# Patient Record
Sex: FEMALE | Race: WHITE | Employment: OTHER | URBAN - METROPOLITAN AREA
[De-identification: names, ages, dates, MRNs, and addresses within clinical notes are randomized per-mention and may not be internally consistent; named-entity substitution may affect disease eponyms.]

---

## 2017-10-02 PROBLEM — R73.01 IFG (IMPAIRED FASTING GLUCOSE): Status: ACTIVE | Noted: 2017-10-02

## 2017-11-28 ENCOUNTER — HOSPITAL ENCOUNTER (OUTPATIENT)
Dept: LAB | Age: 72
Discharge: HOME | End: 2017-11-28

## 2017-11-28 PROCEDURE — 88305 TISSUE EXAM BY PATHOLOGIST: CPT | Performed by: INTERNAL MEDICINE

## 2018-01-02 PROBLEM — M25.551 HIP PAIN, RIGHT: Status: ACTIVE | Noted: 2018-01-02

## 2018-04-06 PROBLEM — E66.01 SEVERE OBESITY (BMI 35.0-39.9) WITH COMORBIDITY (HCC): Status: ACTIVE | Noted: 2018-04-06

## 2019-04-22 PROBLEM — E66.01 SEVERE OBESITY (HCC): Status: RESOLVED | Noted: 2019-04-22 | Resolved: 2019-04-22

## 2019-04-22 PROBLEM — E66.01 SEVERE OBESITY (HCC): Status: ACTIVE | Noted: 2019-04-22

## 2019-04-22 PROBLEM — E66.01 SEVERE OBESITY (BMI 35.0-39.9) WITH COMORBIDITY (HCC): Status: RESOLVED | Noted: 2018-04-06 | Resolved: 2019-04-22

## 2019-04-29 ENCOUNTER — HOSPITAL ENCOUNTER (OUTPATIENT)
Dept: GENERAL RADIOLOGY | Age: 74
Discharge: HOME | End: 2019-04-29
Attending: PHYSICIAN ASSISTANT
Payer: MEDICARE

## 2019-04-29 DIAGNOSIS — R05.9 COUGH: ICD-10-CM

## 2019-04-29 DIAGNOSIS — R06.09 DYSPNEA ON EXERTION: ICD-10-CM

## 2019-04-29 PROCEDURE — 71046 X-RAY EXAM CHEST 2 VIEWS: CPT

## 2019-04-30 ENCOUNTER — HOSPITAL ENCOUNTER (OUTPATIENT)
Dept: CT IMAGING | Age: 74
Discharge: HOME | End: 2019-04-30
Attending: PHYSICIAN ASSISTANT

## 2019-04-30 DIAGNOSIS — R06.09 DYSPNEA ON EXERTION: ICD-10-CM

## 2019-04-30 DIAGNOSIS — R00.0 TACHYCARDIA: ICD-10-CM

## 2019-04-30 RX ORDER — SODIUM CHLORIDE 0.9 % (FLUSH) 0.9 %
10 SYRINGE (ML) INJECTION
Status: COMPLETED | OUTPATIENT
Start: 2019-04-30 | End: 2019-04-30

## 2019-04-30 RX ADMIN — Medication 10 ML: at 15:25

## 2019-06-07 ENCOUNTER — HOSPITAL ENCOUNTER (OUTPATIENT)
Dept: GENERAL RADIOLOGY | Age: 74
Discharge: HOME | End: 2019-06-07
Attending: PHYSICIAN ASSISTANT
Payer: MEDICARE

## 2019-06-07 DIAGNOSIS — J18.9 PNEUMONIA OF RIGHT LUNG DUE TO INFECTIOUS ORGANISM, UNSPECIFIED PART OF LUNG: ICD-10-CM

## 2019-06-07 PROCEDURE — 71046 X-RAY EXAM CHEST 2 VIEWS: CPT

## 2019-06-10 NOTE — PROGRESS NOTES
X-ray shows pneumonia has resolved. Results will be discussed with patient during upcoming office visit.

## 2019-07-08 ENCOUNTER — HOSPITAL ENCOUNTER (OUTPATIENT)
Dept: MAMMOGRAPHY | Age: 74
Discharge: HOME | End: 2019-07-08
Attending: PHYSICIAN ASSISTANT
Payer: MEDICARE

## 2019-07-08 DIAGNOSIS — M81.0 OSTEOPOROSIS WITHOUT CURRENT PATHOLOGICAL FRACTURE, UNSPECIFIED OSTEOPOROSIS TYPE: ICD-10-CM

## 2019-07-08 PROCEDURE — 77080 DXA BONE DENSITY AXIAL: CPT

## 2019-07-08 NOTE — PROGRESS NOTES
Bone density testing shows osteopenia. We can discuss recommendations during upcoming appt if she will remind me.

## 2019-07-18 ENCOUNTER — HOSPITAL ENCOUNTER (OUTPATIENT)
Dept: GENERAL RADIOLOGY | Age: 74
Discharge: HOME | End: 2019-07-18
Payer: MEDICARE

## 2019-07-18 DIAGNOSIS — R93.89 ABNORMAL FINDING ON CHEST XRAY: ICD-10-CM

## 2019-07-18 PROCEDURE — 71046 X-RAY EXAM CHEST 2 VIEWS: CPT

## 2019-07-18 NOTE — PROGRESS NOTES
Chest x-ray looks clear this time! Results will be discussed with patient during upcoming office visit.

## 2020-05-04 ENCOUNTER — HOSPITAL ENCOUNTER (OUTPATIENT)
Dept: LAB | Age: 75
Discharge: HOME | End: 2020-05-04
Attending: PHYSICIAN ASSISTANT
Payer: MEDICARE

## 2020-05-04 DIAGNOSIS — E03.9 ACQUIRED HYPOTHYROIDISM: ICD-10-CM

## 2020-05-04 DIAGNOSIS — N18.30 CKD (CHRONIC KIDNEY DISEASE) STAGE 3, GFR 30-59 ML/MIN (HCC): ICD-10-CM

## 2020-05-04 DIAGNOSIS — R73.01 ELEVATED FASTING GLUCOSE: ICD-10-CM

## 2020-05-04 DIAGNOSIS — E78.5 DYSLIPIDEMIA: ICD-10-CM

## 2020-05-04 DIAGNOSIS — E55.9 VITAMIN D DEFICIENCY: ICD-10-CM

## 2020-05-04 DIAGNOSIS — R82.90 BAD ODOR OF URINE: ICD-10-CM

## 2020-05-04 LAB
ALBUMIN SERPL-MCNC: 3.2 G/DL (ref 3.2–4.6)
ALBUMIN/GLOB SERPL: 0.9 {RATIO} (ref 1.2–3.5)
ALP SERPL-CCNC: 71 U/L (ref 50–136)
ALT SERPL-CCNC: 27 U/L (ref 12–65)
ANION GAP SERPL CALC-SCNC: 7 MMOL/L (ref 7–16)
APPEARANCE UR: ABNORMAL
AST SERPL-CCNC: 21 U/L (ref 15–37)
BACTERIA URNS QL MICRO: ABNORMAL /HPF
BASOPHILS # BLD: 0 K/UL (ref 0–0.2)
BASOPHILS NFR BLD: 0 % (ref 0–2)
BILIRUB SERPL-MCNC: 0.4 MG/DL (ref 0.2–1.1)
BILIRUB UR QL: NEGATIVE
BUN SERPL-MCNC: 13 MG/DL (ref 8–23)
CALCIUM SERPL-MCNC: 8.5 MG/DL (ref 8.3–10.4)
CASTS URNS QL MICRO: ABNORMAL /LPF
CHLORIDE SERPL-SCNC: 107 MMOL/L (ref 98–107)
CHOLEST SERPL-MCNC: 176 MG/DL
CO2 SERPL-SCNC: 27 MMOL/L (ref 21–32)
COLOR UR: YELLOW
CREAT SERPL-MCNC: 0.97 MG/DL (ref 0.6–1)
DIFFERENTIAL METHOD BLD: NORMAL
EOSINOPHIL # BLD: 0.2 K/UL (ref 0–0.8)
EOSINOPHIL NFR BLD: 4 % (ref 0.5–7.8)
EPI CELLS #/AREA URNS HPF: ABNORMAL /HPF
ERYTHROCYTE [DISTWIDTH] IN BLOOD BY AUTOMATED COUNT: 13.4 % (ref 11.9–14.6)
EST. AVERAGE GLUCOSE BLD GHB EST-MCNC: 126 MG/DL
GLOBULIN SER CALC-MCNC: 3.5 G/DL (ref 2.3–3.5)
GLUCOSE SERPL-MCNC: 97 MG/DL (ref 65–100)
GLUCOSE UR STRIP.AUTO-MCNC: NEGATIVE MG/DL
HBA1C MFR BLD: 6 %
HCT VFR BLD AUTO: 43 % (ref 35.8–46.3)
HDLC SERPL-MCNC: 54 MG/DL (ref 40–60)
HDLC SERPL: 3.3 {RATIO}
HGB BLD-MCNC: 13.8 G/DL (ref 11.7–15.4)
HGB UR QL STRIP: NEGATIVE
IMM GRANULOCYTES # BLD AUTO: 0 K/UL (ref 0–0.5)
IMM GRANULOCYTES NFR BLD AUTO: 0 % (ref 0–5)
KETONES UR QL STRIP.AUTO: NEGATIVE MG/DL
LDLC SERPL CALC-MCNC: 92.8 MG/DL
LEUKOCYTE ESTERASE UR QL STRIP.AUTO: ABNORMAL
LIPID PROFILE,FLP: ABNORMAL
LYMPHOCYTES # BLD: 1 K/UL (ref 0.5–4.6)
LYMPHOCYTES NFR BLD: 20 % (ref 13–44)
MCH RBC QN AUTO: 29.3 PG (ref 26.1–32.9)
MCHC RBC AUTO-ENTMCNC: 32.1 G/DL (ref 31.4–35)
MCV RBC AUTO: 91.3 FL (ref 79.6–97.8)
MONOCYTES # BLD: 0.4 K/UL (ref 0.1–1.3)
MONOCYTES NFR BLD: 8 % (ref 4–12)
NEUTS SEG # BLD: 3.4 K/UL (ref 1.7–8.2)
NEUTS SEG NFR BLD: 67 % (ref 43–78)
NITRITE UR QL STRIP.AUTO: NEGATIVE
NRBC # BLD: 0 K/UL (ref 0–0.2)
PH UR STRIP: 5 [PH] (ref 5–9)
PLATELET # BLD AUTO: 225 K/UL (ref 150–450)
PMV BLD AUTO: 10.5 FL (ref 9.4–12.3)
POTASSIUM SERPL-SCNC: 3.9 MMOL/L (ref 3.5–5.1)
PROT SERPL-MCNC: 6.7 G/DL (ref 6.3–8.2)
PROT UR STRIP-MCNC: NEGATIVE MG/DL
RBC # BLD AUTO: 4.71 M/UL (ref 4.05–5.2)
RBC #/AREA URNS HPF: 0 /HPF
SODIUM SERPL-SCNC: 141 MMOL/L (ref 136–145)
SP GR UR REFRACTOMETRY: 1.01 (ref 1–1.02)
TRIGL SERPL-MCNC: 146 MG/DL (ref 35–150)
TSH SERPL DL<=0.005 MIU/L-ACNC: 4.35 UIU/ML
UROBILINOGEN UR QL STRIP.AUTO: 0.2 EU/DL (ref 0.2–1)
VLDLC SERPL CALC-MCNC: 29.2 MG/DL (ref 6–23)
WBC # BLD AUTO: 5 K/UL (ref 4.3–11.1)
WBC URNS QL MICRO: ABNORMAL /HPF

## 2020-05-04 PROCEDURE — 82306 VITAMIN D 25 HYDROXY: CPT

## 2020-05-04 PROCEDURE — 84443 ASSAY THYROID STIM HORMONE: CPT

## 2020-05-04 PROCEDURE — 80053 COMPREHEN METABOLIC PANEL: CPT

## 2020-05-04 PROCEDURE — 36415 COLL VENOUS BLD VENIPUNCTURE: CPT

## 2020-05-04 PROCEDURE — 80061 LIPID PANEL: CPT

## 2020-05-04 PROCEDURE — 81001 URINALYSIS AUTO W/SCOPE: CPT

## 2020-05-04 PROCEDURE — 85025 COMPLETE CBC W/AUTO DIFF WBC: CPT

## 2020-05-04 PROCEDURE — 83036 HEMOGLOBIN GLYCOSYLATED A1C: CPT

## 2020-05-04 NOTE — PROGRESS NOTES
Urine showed leukocytes which may indicate an infection. Blood sugar shows nice improvements! Kidney & liver function are normal.  Cholesterol levels are well maintained with current regimen. Thyroid function is up a bit but within reasonable range. Glucose average is up a little but remains in prediabetic range. Blood counts are normal.  Results will be discussed with patient during upcoming office visit.

## 2020-05-05 LAB — 25(OH)D3+25(OH)D2 SERPL-MCNC: 43.6 NG/ML (ref 30–100)

## 2020-05-05 NOTE — PROGRESS NOTES
Vitamin d levels are well maintained on current supplementation. Results will be discussed with patient during upcoming office visit.

## 2020-11-17 ENCOUNTER — HOSPITAL ENCOUNTER (OUTPATIENT)
Dept: GENERAL RADIOLOGY | Age: 75
Discharge: HOME | End: 2020-11-17
Payer: MEDICARE

## 2020-11-17 DIAGNOSIS — M54.50 ACUTE BILATERAL LOW BACK PAIN WITHOUT SCIATICA: ICD-10-CM

## 2020-11-17 PROCEDURE — 72100 X-RAY EXAM L-S SPINE 2/3 VWS: CPT

## 2020-11-19 NOTE — PROGRESS NOTES
Left message with patient to discuss results. X-ray shows degenerative changes in upper and lower lumbar regions with mild scoliosis. She could benefit from chiropractic care with Dr. Rudy Tyler @ [facility] [place]  ([phone]) or physical therapy.

## 2020-11-20 NOTE — PROGRESS NOTES
Pt notified that x-ray shows degenerative changes in upper and lower lumbar regions with mild scoliosis. She could benefit from chiropractic care with Dr. Genoveva Haines @ [facility] [address] ([phone]) or physical therapy. Pt will contact Dr. Charla [facility] office to schedule an appt with them.

## 2021-07-12 ENCOUNTER — HOSPITAL ENCOUNTER (OUTPATIENT)
Dept: MAMMOGRAPHY | Age: 76
Discharge: HOME | End: 2021-07-12
Attending: PHYSICIAN ASSISTANT
Payer: MEDICARE

## 2021-07-12 DIAGNOSIS — M89.9 BONE DISORDER: ICD-10-CM

## 2021-07-12 DIAGNOSIS — M85.852 OSTEOPENIA OF BOTH HIPS: ICD-10-CM

## 2021-07-12 DIAGNOSIS — M85.851 OSTEOPENIA OF BOTH HIPS: ICD-10-CM

## 2021-07-12 PROCEDURE — 77080 DXA BONE DENSITY AXIAL: CPT

## 2021-07-13 NOTE — PROGRESS NOTES
Bone density study shows persistent osteopenia but has worsened by almost 4% in the past 2 years. I don't see that she currently takes anything specifically for this condition. If this is true, we need to discuss treatment options at next available open appointment.

## 2021-07-13 NOTE — PROGRESS NOTES
Pt notified that her bone density study shows persistent osteopenia but has worsened by almost 4% in the past 2 years. Pt verbalized understanding and is scheduled for a follow-up on 8/5/21.

## 2021-08-25 ENCOUNTER — HOSPITAL ENCOUNTER (OUTPATIENT)
Dept: INFUSION THERAPY | Age: 76
Discharge: HOME | End: 2021-08-25
Payer: MEDICARE

## 2021-08-25 VITALS
OXYGEN SATURATION: 95 % | DIASTOLIC BLOOD PRESSURE: 82 MMHG | TEMPERATURE: 98.3 F | SYSTOLIC BLOOD PRESSURE: 137 MMHG | RESPIRATION RATE: 18 BRPM | HEART RATE: 79 BPM

## 2021-08-25 DIAGNOSIS — M81.0 AGE-RELATED OSTEOPOROSIS WITHOUT CURRENT PATHOLOGICAL FRACTURE: Primary | ICD-10-CM

## 2021-08-25 PROCEDURE — 74011250636 HC RX REV CODE- 250/636: Performed by: PHYSICIAN ASSISTANT

## 2021-08-25 PROCEDURE — 96372 THER/PROPH/DIAG INJ SC/IM: CPT

## 2021-08-25 RX ADMIN — DENOSUMAB 60 MG: 60 INJECTION SUBCUTANEOUS at 10:30

## 2021-08-25 NOTE — PROGRESS NOTES
Arrived to the [facility]. Prolia injection completed. Provided education on same    Patient instructed to report any side affects to ordering provider. Patient tolerated well. Any issues or concerns during appointment: none. Discharged ambulatory.

## 2022-02-25 ENCOUNTER — HOSPITAL ENCOUNTER (OUTPATIENT)
Dept: INFUSION THERAPY | Age: 77
Discharge: HOME | End: 2022-02-25
Payer: MEDICARE

## 2022-02-25 VITALS
SYSTOLIC BLOOD PRESSURE: 120 MMHG | OXYGEN SATURATION: 94 % | RESPIRATION RATE: 18 BRPM | TEMPERATURE: 97.9 F | HEART RATE: 78 BPM | DIASTOLIC BLOOD PRESSURE: 70 MMHG

## 2022-02-25 DIAGNOSIS — M81.0 AGE-RELATED OSTEOPOROSIS WITHOUT CURRENT PATHOLOGICAL FRACTURE: Primary | ICD-10-CM

## 2022-02-25 LAB
MAGNESIUM SERPL-MCNC: 2.2 MG/DL (ref 1.8–2.4)
PHOSPHATE SERPL-MCNC: 2.9 MG/DL (ref 2.3–3.7)

## 2022-02-25 PROCEDURE — 83735 ASSAY OF MAGNESIUM: CPT

## 2022-02-25 PROCEDURE — 96372 THER/PROPH/DIAG INJ SC/IM: CPT

## 2022-02-25 PROCEDURE — 74011250636 HC RX REV CODE- 250/636: Performed by: PHYSICIAN ASSISTANT

## 2022-02-25 PROCEDURE — 84100 ASSAY OF PHOSPHORUS: CPT

## 2022-02-25 RX ADMIN — DENOSUMAB 60 MG: 60 INJECTION SUBCUTANEOUS at 10:51

## 2022-02-25 NOTE — PROGRESS NOTES
Arrived to the [facility]. Peripheral labs (mag and phos, per therapy plan) drawn and Prolia injection completed. Provided education on Prolia. Patient has had this medication before. Opportunity for questions provided. Patient instructed to report any side affects to ordering provider. Patient tolerated well. Any issues or concerns during appointment: no.  Patient aware of next lab/infusion appointment on 8/25/2022 (date) at 8 am (time). Discharged ambulatory with self.

## 2022-03-19 PROBLEM — M25.551 HIP PAIN, RIGHT: Status: ACTIVE | Noted: 2018-01-02

## 2022-03-20 PROBLEM — E66.01 SEVERE OBESITY (HCC): Status: ACTIVE | Noted: 2019-04-22

## 2022-03-20 PROBLEM — R73.01 IFG (IMPAIRED FASTING GLUCOSE): Status: ACTIVE | Noted: 2017-10-02

## 2022-06-01 RX ORDER — ESCITALOPRAM OXALATE 20 MG/1
TABLET ORAL
Qty: 90 TABLET | OUTPATIENT
Start: 2022-06-01

## 2022-06-01 RX ORDER — ROSUVASTATIN CALCIUM 10 MG/1
TABLET, COATED ORAL
Qty: 90 TABLET | OUTPATIENT
Start: 2022-06-01

## 2022-06-02 RX ORDER — CLONAZEPAM 1 MG/1
TABLET ORAL
Qty: 30 TABLET | OUTPATIENT
Start: 2022-06-02

## 2022-06-06 RX ORDER — ROSUVASTATIN CALCIUM 10 MG/1
TABLET, COATED ORAL
Qty: 90 TABLET | OUTPATIENT
Start: 2022-06-06

## 2022-06-13 DIAGNOSIS — R73.01 IFG (IMPAIRED FASTING GLUCOSE): ICD-10-CM

## 2022-06-13 DIAGNOSIS — E55.9 VITAMIN D DEFICIENCY: ICD-10-CM

## 2022-06-13 DIAGNOSIS — Z00.00 MEDICARE ANNUAL WELLNESS VISIT, SUBSEQUENT: Primary | ICD-10-CM

## 2022-06-13 DIAGNOSIS — E03.9 HYPOTHYROIDISM, UNSPECIFIED TYPE: ICD-10-CM

## 2022-06-13 DIAGNOSIS — E78.5 DYSLIPIDEMIA: ICD-10-CM

## 2022-06-14 ENCOUNTER — NURSE ONLY (OUTPATIENT)
Dept: INTERNAL MEDICINE CLINIC | Facility: CLINIC | Age: 77
End: 2022-06-14

## 2022-06-14 DIAGNOSIS — E03.9 HYPOTHYROIDISM, UNSPECIFIED TYPE: ICD-10-CM

## 2022-06-14 DIAGNOSIS — Z00.00 MEDICARE ANNUAL WELLNESS VISIT, SUBSEQUENT: ICD-10-CM

## 2022-06-14 DIAGNOSIS — R73.01 IFG (IMPAIRED FASTING GLUCOSE): ICD-10-CM

## 2022-06-14 DIAGNOSIS — E78.5 DYSLIPIDEMIA: ICD-10-CM

## 2022-06-14 DIAGNOSIS — E55.9 VITAMIN D DEFICIENCY: ICD-10-CM

## 2022-06-15 LAB
25(OH)D3 SERPL-MCNC: 69.7 NG/ML (ref 30–100)
ALBUMIN SERPL-MCNC: 3.5 G/DL (ref 3.2–4.6)
ALBUMIN/GLOB SERPL: 1.3 {RATIO} (ref 1.2–3.5)
ALP SERPL-CCNC: 54 U/L (ref 50–136)
ALT SERPL-CCNC: 31 U/L (ref 12–65)
ANION GAP SERPL CALC-SCNC: 5 MMOL/L (ref 7–16)
AST SERPL-CCNC: 19 U/L (ref 15–37)
BASOPHILS # BLD: 0 K/UL (ref 0–0.2)
BASOPHILS NFR BLD: 1 % (ref 0–2)
BILIRUB SERPL-MCNC: 0.4 MG/DL (ref 0.2–1.1)
BUN SERPL-MCNC: 18 MG/DL (ref 8–23)
CALCIUM SERPL-MCNC: 8.8 MG/DL (ref 8.3–10.4)
CHLORIDE SERPL-SCNC: 109 MMOL/L (ref 98–107)
CHOLEST SERPL-MCNC: 163 MG/DL
CO2 SERPL-SCNC: 28 MMOL/L (ref 21–32)
CREAT SERPL-MCNC: 1.1 MG/DL (ref 0.6–1)
DIFFERENTIAL METHOD BLD: ABNORMAL
EOSINOPHIL # BLD: 0.4 K/UL (ref 0–0.8)
EOSINOPHIL NFR BLD: 7 % (ref 0.5–7.8)
ERYTHROCYTE [DISTWIDTH] IN BLOOD BY AUTOMATED COUNT: 13.2 % (ref 11.9–14.6)
EST. AVERAGE GLUCOSE BLD GHB EST-MCNC: 123 MG/DL
GLOBULIN SER CALC-MCNC: 2.7 G/DL (ref 2.3–3.5)
GLUCOSE SERPL-MCNC: 94 MG/DL (ref 65–100)
HBA1C MFR BLD: 5.9 % (ref 4.2–6.3)
HCT VFR BLD AUTO: 45.3 % (ref 35.8–46.3)
HDLC SERPL-MCNC: 51 MG/DL (ref 40–60)
HDLC SERPL: 3.2 {RATIO}
HGB BLD-MCNC: 14.2 G/DL (ref 11.7–15.4)
IMM GRANULOCYTES # BLD AUTO: 0 K/UL (ref 0–0.5)
IMM GRANULOCYTES NFR BLD AUTO: 0 % (ref 0–5)
LDLC SERPL CALC-MCNC: 81.8 MG/DL
LYMPHOCYTES # BLD: 1.3 K/UL (ref 0.5–4.6)
LYMPHOCYTES NFR BLD: 26 % (ref 13–44)
MCH RBC QN AUTO: 30.5 PG (ref 26.1–32.9)
MCHC RBC AUTO-ENTMCNC: 31.3 G/DL (ref 31.4–35)
MCV RBC AUTO: 97.2 FL (ref 79.6–97.8)
MONOCYTES # BLD: 0.4 K/UL (ref 0.1–1.3)
MONOCYTES NFR BLD: 8 % (ref 4–12)
NEUTS SEG # BLD: 3 K/UL (ref 1.7–8.2)
NEUTS SEG NFR BLD: 58 % (ref 43–78)
NRBC # BLD: 0 K/UL (ref 0–0.2)
PLATELET # BLD AUTO: 206 K/UL (ref 150–450)
PMV BLD AUTO: 10.8 FL (ref 9.4–12.3)
POTASSIUM SERPL-SCNC: 5.4 MMOL/L (ref 3.5–5.1)
PROT SERPL-MCNC: 6.2 G/DL (ref 6.3–8.2)
RBC # BLD AUTO: 4.66 M/UL (ref 4.05–5.2)
SODIUM SERPL-SCNC: 142 MMOL/L (ref 136–145)
TRIGL SERPL-MCNC: 151 MG/DL (ref 35–150)
TSH, 3RD GENERATION: 1.93 UIU/ML (ref 0.36–3.74)
VLDLC SERPL CALC-MCNC: 30.2 MG/DL (ref 6–23)
WBC # BLD AUTO: 5.2 K/UL (ref 4.3–11.1)

## 2022-06-27 ENCOUNTER — OFFICE VISIT (OUTPATIENT)
Dept: INTERNAL MEDICINE CLINIC | Facility: CLINIC | Age: 77
End: 2022-06-27
Payer: MEDICARE

## 2022-06-27 VITALS
WEIGHT: 234 LBS | TEMPERATURE: 97.9 F | HEART RATE: 90 BPM | DIASTOLIC BLOOD PRESSURE: 70 MMHG | OXYGEN SATURATION: 97 % | HEIGHT: 64 IN | BODY MASS INDEX: 39.95 KG/M2 | SYSTOLIC BLOOD PRESSURE: 125 MMHG

## 2022-06-27 DIAGNOSIS — Z00.00 MEDICARE ANNUAL WELLNESS VISIT, SUBSEQUENT: Primary | ICD-10-CM

## 2022-06-27 DIAGNOSIS — E03.9 HYPOTHYROIDISM, UNSPECIFIED TYPE: ICD-10-CM

## 2022-06-27 DIAGNOSIS — M15.9 PRIMARY OSTEOARTHRITIS INVOLVING MULTIPLE JOINTS: ICD-10-CM

## 2022-06-27 DIAGNOSIS — F51.04 CHRONIC INSOMNIA: ICD-10-CM

## 2022-06-27 DIAGNOSIS — R92.1 BREAST CALCIFICATIONS ON MAMMOGRAM: ICD-10-CM

## 2022-06-27 DIAGNOSIS — F41.1 GENERALIZED ANXIETY DISORDER: ICD-10-CM

## 2022-06-27 DIAGNOSIS — R73.01 IFG (IMPAIRED FASTING GLUCOSE): ICD-10-CM

## 2022-06-27 DIAGNOSIS — Z12.31 SCREENING MAMMOGRAM FOR BREAST CANCER: ICD-10-CM

## 2022-06-27 DIAGNOSIS — E87.8 ELECTROLYTE ABNORMALITY: ICD-10-CM

## 2022-06-27 DIAGNOSIS — E78.5 DYSLIPIDEMIA: ICD-10-CM

## 2022-06-27 DIAGNOSIS — N28.9 RENAL INSUFFICIENCY: ICD-10-CM

## 2022-06-27 DIAGNOSIS — F43.21 COMPLICATED GRIEF: ICD-10-CM

## 2022-06-27 DIAGNOSIS — E55.9 VITAMIN D DEFICIENCY: ICD-10-CM

## 2022-06-27 PROBLEM — N18.30 CHRONIC RENAL DISEASE, STAGE III (HCC): Status: ACTIVE | Noted: 2022-06-27

## 2022-06-27 PROCEDURE — 4004F PT TOBACCO SCREEN RCVD TLK: CPT | Performed by: PHYSICIAN ASSISTANT

## 2022-06-27 PROCEDURE — G8427 DOCREV CUR MEDS BY ELIG CLIN: HCPCS | Performed by: PHYSICIAN ASSISTANT

## 2022-06-27 PROCEDURE — G8417 CALC BMI ABV UP PARAM F/U: HCPCS | Performed by: PHYSICIAN ASSISTANT

## 2022-06-27 PROCEDURE — 1090F PRES/ABSN URINE INCON ASSESS: CPT | Performed by: PHYSICIAN ASSISTANT

## 2022-06-27 PROCEDURE — G0439 PPPS, SUBSEQ VISIT: HCPCS | Performed by: PHYSICIAN ASSISTANT

## 2022-06-27 PROCEDURE — G8399 PT W/DXA RESULTS DOCUMENT: HCPCS | Performed by: PHYSICIAN ASSISTANT

## 2022-06-27 PROCEDURE — 1123F ACP DISCUSS/DSCN MKR DOCD: CPT | Performed by: PHYSICIAN ASSISTANT

## 2022-06-27 PROCEDURE — 3017F COLORECTAL CA SCREEN DOC REV: CPT | Performed by: PHYSICIAN ASSISTANT

## 2022-06-27 PROCEDURE — 99214 OFFICE O/P EST MOD 30 MIN: CPT | Performed by: PHYSICIAN ASSISTANT

## 2022-06-27 RX ORDER — DICLOFENAC 35 MG/1
CAPSULE ORAL
Qty: 90 CAPSULE | OUTPATIENT
Start: 2022-06-27

## 2022-06-27 RX ORDER — DICLOFENAC 35 MG/1
1 CAPSULE ORAL 3 TIMES DAILY PRN
Qty: 90 CAPSULE | Refills: 11 | Status: SHIPPED | OUTPATIENT
Start: 2022-06-27

## 2022-06-27 RX ORDER — ACETAMINOPHEN 500 MG
500 TABLET ORAL EVERY 6 HOURS PRN
COMMUNITY
End: 2022-10-25 | Stop reason: ALTCHOICE

## 2022-06-27 RX ORDER — ERGOCALCIFEROL 1.25 MG/1
50000 CAPSULE ORAL WEEKLY
COMMUNITY
End: 2022-06-27 | Stop reason: ALTCHOICE

## 2022-06-27 RX ORDER — CLONAZEPAM 1 MG/1
1 TABLET ORAL NIGHTLY
Qty: 30 TABLET | Refills: 5 | Status: SHIPPED | OUTPATIENT
Start: 2022-07-02 | End: 2022-08-01

## 2022-06-27 RX ORDER — LORAZEPAM 1 MG/1
1 TABLET ORAL 2 TIMES DAILY PRN
Qty: 30 TABLET | Refills: 0 | Status: SHIPPED | OUTPATIENT
Start: 2022-06-27 | End: 2022-10-26 | Stop reason: SDUPTHER

## 2022-06-27 ASSESSMENT — LIFESTYLE VARIABLES
HOW MANY STANDARD DRINKS CONTAINING ALCOHOL DO YOU HAVE ON A TYPICAL DAY: 1 OR 2
HOW OFTEN DO YOU HAVE A DRINK CONTAINING ALCOHOL: 2-4 TIMES A MONTH

## 2022-06-27 ASSESSMENT — PATIENT HEALTH QUESTIONNAIRE - PHQ9
2. FEELING DOWN, DEPRESSED OR HOPELESS: 1
SUM OF ALL RESPONSES TO PHQ9 QUESTIONS 1 & 2: 1
SUM OF ALL RESPONSES TO PHQ QUESTIONS 1-9: 1
1. LITTLE INTEREST OR PLEASURE IN DOING THINGS: 0
SUM OF ALL RESPONSES TO PHQ QUESTIONS 1-9: 1

## 2022-06-27 NOTE — PROGRESS NOTES
Medicare Annual Wellness Visit    Teresa Davies is here for Medicare AWV, Follow-up (Hyperlipidemia, Hypothyroidism, Arthritis, Elevated Glucose), and Medication Refill    Assessment & Plan   Medicare annual wellness visit, subsequent  Screening mammogram for breast cancer  -     MELINA JOSE DIGITAL SCREEN BILATERAL; Future  Breast calcifications on mammogram  -     MELINA JOSE DIGITAL SCREEN BILATERAL; Future  Chronic insomnia  -     clonazePAM (KLONOPIN) 1 MG tablet; Take 1 tablet by mouth nightly for 30 days. , Disp-30 tablet, R-5Normal  IFG (impaired fasting glucose)  -     Comprehensive Metabolic Panel; Future  -     Hemoglobin A1C; Future  Dyslipidemia  -     Lipid Panel; Future  Hypothyroidism, unspecified type  -     TSH; Future  Vitamin D deficiency  -     Vitamin D 25 Hydroxy; Future  Renal insufficiency  -     Comprehensive Metabolic Panel; Future  Generalized anxiety disorder  -     LORazepam (ATIVAN) 1 MG tablet; Take 1 tablet by mouth 2 times daily as needed for Anxiety for up to 30 days. , Disp-30 tablet, [id number]  Complicated grief  -     LORazepam (ATIVAN) 1 MG tablet; Take 1 tablet by mouth 2 times daily as needed for Anxiety for up to 30 days. , Disp-30 tablet, R-0Normal  Primary osteoarthritis involving multiple joints  -     ZORVOLEX 35 MG CAPS; Take 1 capsule by mouth 3 times daily as needed (joint pain) Brand medically necessary!, Disp-90 capsule, R-11, DAWNormal  Electrolyte abnormality  -     Comprehensive Metabolic Panel;  Future         Patient Instructions   Urged to reduce Zorvolex dose back down to once daily as soon as her joint ailments/injuries will allow  Recommend staying well hydrated with plenty of water to help keep kidney function strong  Encouraged to get back on the counselor's schedule but possibly consider a reduced frequency as weekly may have been too overwhelming for her given all that was coming to light - suggest specifically discussing ways to help overcome a panic attack without relying on medication  Consider trial addition of [place] L-Methylfolate 15 mg daily  Praised her dietary efforts to keep sugar intake low  Reminded how essential a regular exercise routine is to overcoming prediabetes & elevated glucose levels  Continue chronic medications as prescribed  Advised to proceed with shingles vaccine ASAP and plan to get 2nd dose in September  Asked to press on LLQ over the next few days when lying down and if not reproducible then no reason to worry - if it persists despite trying to recreate it after a bowel movement when that portion of the colon would be empty then we may want to order some imaging to evaluate her ovaries      Personalized Preventive Plan for Tyson Ramirez - 6/27/2022  Medicare offers a range of preventive health benefits. Some of the tests and screenings are paid in full while other may be subject to a deductible, co-insurance, and/or copay. Some of these benefits include a comprehensive review of your medical history including lifestyle, illnesses that may run in your family, and various assessments and screenings as appropriate. After reviewing your medical record and screening and assessments performed today your provider may have ordered immunizations, labs, imaging, and/or referrals for you. A list of these orders (if applicable) as well as your Preventive Care list are included within your After Visit Summary for your review. Other Preventive Recommendations:    · A preventive eye exam performed by an eye specialist is recommended every 1-2 years to screen for glaucoma; cataracts, macular degeneration, and other eye disorders. · A preventive dental visit is recommended every 6 months. · Try to get at least 150 minutes of exercise per week or 10,000 steps per day on a pedometer . · Order or download the FREE \"Exercise & Physical Activity: Your Everyday Guide\" from [employer].  Call 5-855.917.8908 or search The [employer] on [employer]. · You need 1956-1297 mg of calcium and 0487-8949 IU of vitamin D per day. It is possible to meet your calcium requirement with diet alone, but a vitamin D supplement is usually necessary to meet this goal.  · When exposed to the sun, use a sunscreen that protects against both UVA and UVB radiation with an SPF of 30 or greater. Reapply every 2 to 3 hours or after sweating, drying off with a towel, or swimming. · Always wear a seat belt when traveling in a car. Always wear a helmet when riding a bicycle or motorcycle. Recommendations for Preventive Services Due: see orders and patient instructions/AVS.  Recommended screening schedule for the next 5-10 years is provided to the patient in written form: see Patient Instructions/AVS.     Return in 4 months (on 10/27/2022) for routine f/u. Subjective    The patient is a 78 y.o. female who is seen for a comprehensive physical exam.  Health behaviors: follows a diabetic diet regularly, nonsmoker, making efforts to exercise but currently limited by ankle injury. Date of last physical exam: June 2021. The patient is menopausal and is not status post hysterectomy. Current gynecologic symptoms include: none. She performs occasional self breast exams. I have reviewed the patient's medical history in detail and updated the computerized patient record. Previous Preventative Testing:  Mammogram: May 2021 (results: normal); Pelvic Exam: Jan 2021 (results: normal); Bone Density: July 2021 (results: abnormal - worsened osteopenia); Colonoscopy: Nov 2017 (results: abnormal - colon polyps, diverticulosis, internal hemorrhoid); EKG: Jan 2021 (results: normal); Chest X-Ray (if smoker): July 2019 (results: normal);  Hemoccult: Never; Glaucoma Screening: July 2021 (results: not currently available for review)     Immunizations: up to date and documented  Immunization History   Administered Date(s) Administered   [facility] DILUTE for use, 12+ yrs, 30mcg/0.3mL dose 01/27/2021, 02/23/2021, 10/08/2021    Influenza Trivalent 09/12/2014    Influenza Virus Vaccine 10/01/2008, 01/01/2011, 01/01/2012, 10/30/2013    Influenza, High Dose (Fluzone 65 yrs and older) 10/27/2015, 09/08/2016, 10/02/2017, 10/16/2018    Influenza, Quadv, adjuvanted, 65 yrs +, IM, PF (Fluad) 10/07/2020, 11/22/2021    Influenza, Triv, inactivated, subunit, adjuvanted, IM (Fluad 65 yrs and older) 10/24/2019    Pneumococcal Conjugate 13-valent ([id number]) 09/15/2015    Pneumococcal Polysaccharide ([id number]) 10/20/2011, 03/20/2017    Pneumococcal Vaccine 10/20/2011    Tdap (Boostrix, Adacel) 09/15/2015    Zoster Live (Zostavax) 09/15/2015       The following acute and/or chronic problems were also addressed today:      Patient is here for follow-up of elevated fasting glucose. The current state of this condition is asymptomatic, improved and well controlled - not currently on medications for this problem. She makes efforts to minimize intake of sweets but admits she struggles with carbohydrates & snacks. Additionally, she doesn't cook much so most of her meals are eating out. Lab Results   Component Value Date    LABA1C 5.9 06/14/2022     Lab Results   Component Value Date     06/14/2022       The patient is a 78 y.o. female who is seen for evaluation of hyperlipidemia. She was tested because of dyslipidemia. The current state of this condition is no significant medication side effects noted and mixed control - triglycerides increased but LDL decreased on Crestor 10 mg q hs - taking as prescribed.        Last Lipid Panel:   Lab Results   Component Value Date    CHOL 163 06/14/2022    CHOL 174 02/14/2022    CHOL 165 11/16/2021     Lab Results   Component Value Date    TRIG 151 (H) 06/14/2022    TRIG 129 02/14/2022    TRIG 138 11/16/2021     Lab Results   Component Value Date    HDL 51 06/14/2022    HDL 56 02/14/2022    HDL 46 11/16/2021     Lab Results   Component Value Date    LDLCALC 81.8 06/14/2022    [address] 02/14/2022    LDLCALC 95 11/16/2021     Lab Results   Component Value Date    LABVLDL 30.2 (H) 06/14/2022    LABVLDL 29.2 (H) 05/04/2020    LABVLDL 21 10/17/2019    VLDL 23 02/14/2022    VLDL 24 11/16/2021    VLDL 21 05/10/2021     Lab Results   Component Value Date    CHOLHDLRATIO 3.2 06/14/2022    CHOLHDLRATIO 3.3 05/04/2020       The patient is a 78 y.o. female who is seen for follow up of hypothyroidism. Current symptoms: none. Symptoms are basically asymptomatic. The patient is following treatment regimen with good compliance. Current treatment regimen consists of Synthroid 50 mcg daily and is  taking it first thing in the AM on an empty stomach with no other food/liquids/other medications for at least 45-60 minutes. TSH   Date Value Ref Range Status   02/14/2022 3.650 0.450 - 4.500 uIU/mL Final   11/16/2021 2.350 0.450 - 4.500 uIU/mL Final   08/11/2021 6.360 (H) 0.450 - 4.500 uIU/mL Final       Patient is here for follow-up of vitamin d deficiency. The current state of this condition is no significant medication side effects noted and well controlled on OTC vitamin d supplement bid - taking as prescribed. Recent blood work showed well sustained levels despite being off Ergocalciferol since February of 69.7 from 7.31 ng/mL. Patient is here for follow-up of chronic insomnia. The current state of this condition is no significant medication side effects noted and well controlled on Clonazepam 1 mg q hs - taking as prescribed. Current prescription was filled 6/2/22 for #30 with no refills remaining. Patient is here for follow-up of anxiety with complicated grief.   The current state of this condition is no significant medication side effects noted and poorly controlled on Lexparo 20 mg daily + Ativan 1 mg prn (using more recently due to overwhelming emotions brought out by counseling - ~ 3 times/week) - taking as prescribed. Current prescription was filled 4/21/22 for #30 with no refills. She started counseling weekly for a while which was helpful in understanding some of the complicated dynamics with her mother but is currently taking \"a break\". She describes easily crying at anything which is very atypical for her and has even experienced what she describes as a panic attack that is also unusual for her. Patient is here for follow-up of osteoarthritis. The current state of this condition is no significant medication side effects noted, reasonably well controlled and well controlled on Zorvolex 35 mg tid - not taking 3rd dose as prescribed. Past Medical History:   Diagnosis Date    Anxiety     Situational    Chronic insomnia 5/20/2013    Diverticulosis 5/11/2006    Dyslipidemia 5/20/2013    Eczema     Environmental and seasonal allergies     GERD (gastroesophageal reflux disease)     Hypothyroidism     Internal hemorrhoids 5/11/2006    Open-angle glaucoma 07/2019    Bilateral - Mild    Osteoarthritis of both knees     Osteopenia of both hips 07/08/2019    Pneumonia 04/29/2019    Right    Vitamin D deficiency 5/20/2013         Past Surgical History:   Procedure Laterality Date    BLADDER REPAIR  09/17/2013    mesh sling    CATARACT REMOVAL Bilateral 10/14/2019 & 10/28/19    COLONOSCOPY  11/28/2017    Hyperplastic Polyp    TONSILLECTOMY      TOTAL KNEE ARTHROPLASTY Left 12/17/2019    TOTAL KNEE ARTHROPLASTY Right 07/21/2020         Social History     Socioeconomic History    Marital status:      Spouse name: None    Number of children: None    Years of education: None    Highest education level: None   Occupational History    None   Tobacco Use    Smoking status: Never Smoker    Smokeless tobacco: Never Used   Vaping Use    Vaping Use: Never used   Substance and Sexual Activity    Alcohol use:  Yes     Alcohol/week: 1.0 - 2.0 standard drink     Types: 1 - 2 Glasses of wine per week    Drug use: Never    Sexual activity: None   Other Topics Concern    None   Social History Narrative         Social Determinants of Health     Financial Resource Strain:     Difficulty of Paying Living Expenses: Not on file   Food Insecurity:     Worried About Running Out of Food in the Last Year: Not on file    Karmen of Food in the Last Year: Not on file   Transportation Needs:     Lack of Transportation (Medical): Not on file    Lack of Transportation (Non-Medical):  Not on file   Physical Activity: Unknown    Days of Exercise per Week: Patient refused    Minutes of Exercise per Session: Patient refused   Stress:     Feeling of Stress : Not on file   Social Connections:     Frequency of Communication with Friends and Family: Not on file    Frequency of Social Gatherings with Friends and Family: Not on file    Attends Baptism Services: Not on file    Active Member of [address] [place] [address] [employer] or Organizations: Not on file    Attends Club or Organization Meetings: Not on file    Marital Status: Not on file   Intimate Partner Violence:     Fear of Current or Ex-Partner: Not on file    Emotionally Abused: Not on file    Physically Abused: Not on file    Sexually Abused: Not on file   Housing Stability:     Unable to Pay for Housing in the Last Year: Not on file    Number of [place] in the Last Year: Not on file    Unstable Housing in the Last Year: Not on file         Family History   Problem Relation Age of Onset    Heart Failure Mother     High Cholesterol Mother     High Cholesterol Father     Dementia Father     Arrhythmia Brother         Atrial Fibrillation    Breast Cancer Neg Hx          Current Outpatient Medications   Medication Sig Dispense Refill    Cyanocobalamin (VITAMIN B 12 PO) Take 1 tablet by mouth daily      Cranberry 125 MG TABS Take 450 mg by mouth daily      vitamin D (ERGOCALCIFEROL) 1.25 MG (37116 UT) CAPS capsule Take 50,000 Units by mouth once a week  Multiple Vitamins-Minerals (MULTIVITAMIN ADULTS PO) Take 1 tablet by mouth daily      LACTOBACILLUS BIFIDUS PO Take 1 capsule by mouth      tretinoin (RETIN-A) 0.025 % cream APPLY SMALL AMOUNT TOPICALLY TO DRY FACE EVERY NIGHT      acetaminophen (TYLENOL) 500 MG tablet Take 500 mg by mouth every 6 hours as needed for Pain      clonazePAM (KLONOPIN) 1 MG tablet Take 1 tablet by mouth nightly for 30 days. 30 tablet 0    ZINC PO Take by mouth      ascorbic acid (VITAMIN C) 1000 MG tablet Take by mouth      Biotin 2.5 MG CAPS Take by mouth      denosumab (PROLIA) 60 MG/ML SOSY SC injection Inject 60 mg into the skin      Diclofenac 35 MG CAPS Take 1 tablet by mouth 3 times daily (with meals)      escitalopram (LEXAPRO) 20 MG tablet Take 20 mg by mouth daily      famotidine (PEPCID) 20 MG tablet Take 20 mg by mouth 2 times daily      fluticasone (CUTIVATE) 0.05 % cream Apply topically 2 times daily      fluticasone (FLONASE) 50 MCG/ACT nasal spray 2 sprays by Nasal route daily      latanoprost (XALATAN) 0.005 % ophthalmic solution INSTILL 1 DROP IN BOTH EYES AT NIGHT      levothyroxine (SYNTHROID) 50 MCG tablet Take 50 mcg by mouth every morning (before breakfast)      LORazepam (ATIVAN) 1 MG tablet Take 1 mg by mouth every 6 hours as needed.  melatonin 1 MG tablet Take 2 mg by mouth      rosuvastatin (CRESTOR) 10 MG tablet Take 10 mg by mouth      diphenhydrAMINE (SOMINEX) 25 MG tablet Take 25 mg by mouth every 6 hours as needed (Patient not taking: Reported on 6/27/2022)       No current facility-administered medications for this visit. Review of Systems   Constitutional: Negative for fatigue and unexpected weight change. Eyes: Negative for visual disturbance. Respiratory: Negative for shortness of breath. Cardiovascular: Negative for chest pain, palpitations and leg swelling. Gastrointestinal: Negative for constipation and diarrhea.    Endocrine: Negative for cold intolerance, heat intolerance and polydipsia. Neg hair loss   Genitourinary: Negative for frequency. Musculoskeletal: Positive for arthralgias (L ankle). Negative for myalgias. Neurological: Negative for dizziness, numbness and headaches. Psychiatric/Behavioral: Positive for dysphoric mood (crying easily). Negative for sleep disturbance (controlled with medication). The patient is nervous/anxious. Patient's complete Health Risk Assessment and screening values have been reviewed and are found in Flowsheets. The following problems were reviewed today and where indicated follow up appointments were made and/or referrals ordered.     Positive Risk Factor Screenings with Interventions:             General Health and ACP:  General  In general, how would you say your health is?: Good  In the past 7 days, have you experienced any of the following: New or Increased Pain, New or Increased Fatigue, Loneliness, Social Isolation, Stress or Anger?: No  Do you get the social and emotional support that you need?: (!) No  Do you have a Living Will?: Yes    Advance Directives     Power of  Living Will ACP-Advance Directive ACP-Power of     Not on File Not on File Not on File Not on File      General Health Risk Interventions:  · Inadequate social/emotional support: referral for counseling/psychotherapy     Health Habits/Nutrition:     Physical Activity: Unknown    Days of Exercise per Week: Patient refused    Minutes of Exercise per Session: Patient refused     Have you lost any weight without trying in the past 3 months?: No  Body mass index: (!) 40.8  Have you seen the dentist within the past year?: Yes    Health Habits/Nutrition Interventions:  · Inadequate physical activity:  patient agrees to increase physical activity as follows: will start gradually now that ortho has released her to resume walking for exercise             Objective   Vitals:    06/27/22 1408   BP: 125/70   Site: Left Upper Arm Position: Sitting   Cuff Size: Large Adult   Pulse: 90   Temp: 97.9 °F (36.6 °C)   TempSrc: Temporal   SpO2: 97%   Weight: 234 lb (106.1 kg)   Height: 5' 3.5\" (1.613 m)      Body mass index is 40.8 kg/m². Physical Exam  Constitutional:       Appearance: Normal appearance. HENT:      Head: Normocephalic and atraumatic. Right Ear: Tympanic membrane, ear canal and external ear normal.      Left Ear: Tympanic membrane, ear canal and external ear normal.      Nose: Nose normal.      Right Turbinates: Not swollen. Left Turbinates: Not swollen. Mouth/Throat:      Mouth: Mucous membranes are moist.      Pharynx: Oropharynx is clear. Eyes:      Extraocular Movements: Extraocular movements intact. Conjunctiva/sclera: Conjunctivae normal.      Pupils: Pupils are equal, round, and reactive to light. Neck:      Thyroid: No thyromegaly. Vascular: No carotid bruit. Cardiovascular:      Rate and Rhythm: Normal rate and regular rhythm. Pulses: Normal pulses. Heart sounds: Normal heart sounds. Pulmonary:      Effort: Pulmonary effort is normal.      Breath sounds: Normal breath sounds. Abdominal:      General: Bowel sounds are normal.      Palpations: Abdomen is soft. Tenderness: There is abdominal tenderness in the left lower quadrant. Musculoskeletal:         General: Normal range of motion. Cervical back: Normal range of motion. Right lower leg: No edema. Left lower leg: No edema. Skin:     General: Skin is warm and dry. Neurological:      Mental Status: She is alert and oriented to person, place, and time. Deep Tendon Reflexes:      Reflex Scores:       Bicep reflexes are 2+ on the right side and 2+ on the left side. Patellar reflexes are 2+ on the right side and 2+ on the left side. Psychiatric:         Mood and Affect: Mood normal.         Behavior: Behavior normal.         Thought Content:  Thought content normal.         Judgment: Judgment normal.              Allergies   Allergen Reactions    Penicillins Hives    Cefprozil Nausea And Vomiting    Erythromycin Rash     Prior to Visit Medications    Medication Sig Taking? Authorizing Provider   Cyanocobalamin (VITAMIN B 12 PO) Take 1 tablet by mouth daily Yes Historical Provider, MD   Cranberry 125 MG TABS Take 450 mg by mouth daily Yes Historical Provider, MD   vitamin D (ERGOCALCIFEROL) 1.25 MG (02958 UT) CAPS capsule Take 50,000 Units by mouth once a week Yes Historical Provider, MD   Multiple Vitamins-Minerals (MULTIVITAMIN ADULTS PO) Take 1 tablet by mouth daily Yes Historical Provider, MD   LACTOBACILLUS BIFIDUS PO Take 1 capsule by mouth Yes Historical Provider, MD   tretinoin (RETIN-A) 0.025 % cream APPLY SMALL AMOUNT TOPICALLY TO DRY FACE EVERY NIGHT Yes Historical Provider, MD   acetaminophen (TYLENOL) 500 MG tablet Take 500 mg by mouth every 6 hours as needed for Pain Yes Historical Provider, MD   clonazePAM (KLONOPIN) 1 MG tablet Take 1 tablet by mouth nightly for 30 days.  Yes Duke Akers PA-C   ZINC PO Take by mouth Yes Ar Automatic Reconciliation   ascorbic acid (VITAMIN C) 1000 MG tablet Take by mouth Yes Ar Automatic Reconciliation   Biotin 2.5 MG CAPS Take by mouth Yes Ar Automatic Reconciliation   denosumab (PROLIA) 60 MG/ML SOSY SC injection Inject 60 mg into the skin Yes Ar Automatic Reconciliation   Diclofenac 35 MG CAPS Take 1 tablet by mouth 3 times daily (with meals) Yes Ar Automatic Reconciliation   escitalopram (LEXAPRO) 20 MG tablet Take 20 mg by mouth daily Yes Ar Automatic Reconciliation   famotidine (PEPCID) 20 MG tablet Take 20 mg by mouth 2 times daily Yes Ar Automatic Reconciliation   fluticasone (CUTIVATE) 0.05 % cream Apply topically 2 times daily Yes Ar Automatic Reconciliation   fluticasone (FLONASE) 50 MCG/ACT nasal spray 2 sprays by Nasal route daily Yes Ar Automatic Reconciliation   latanoprost (XALATAN) 0.005 % ophthalmic solution INSTILL 1 DROP IN BOTH EYES AT NIGHT Yes Ar Automatic Reconciliation   levothyroxine (SYNTHROID) 50 MCG tablet Take 50 mcg by mouth every morning (before breakfast) Yes Ar Automatic Reconciliation   LORazepam (ATIVAN) 1 MG tablet Take 1 mg by mouth every 6 hours as needed. Yes Ar Automatic Reconciliation   melatonin 1 MG tablet Take 2 mg by mouth Yes Ar Automatic Reconciliation   rosuvastatin (CRESTOR) 10 MG tablet Take 10 mg by mouth Yes Ar Automatic Reconciliation   diphenhydrAMINE (SOMINEX) 25 MG tablet Take 25 mg by mouth every 6 hours as needed  Patient not taking: Reported on 6/27/2022  Ar Automatic Reconciliation       CareTeam (Including outside providers/suppliers regularly involved in providing care):   Patient Care Team:  Christian Álvarez MD as PCP - Pollo Perera MD as PCP - [facility] Provider  Layton Mena MD as Physician     Reviewed and updated this visit:  Allergies  Meds  Med Hx  Surg Hx  Soc Hx  Fam Hx          Greater than 50% of this 35 minute visit separate from [employer] [facility] components was spent counseling Magy Glasgow about her lab results, status of chronic conditions, recommended medication changes and lifestyle modifications to gain or maintain good control of her overall health.

## 2022-06-27 NOTE — PATIENT INSTRUCTIONS
Urged to reduce Zorvolex dose back down to once daily as soon as her joint ailments/injuries will allow  Recommend staying well hydrated with plenty of water to help keep kidney function strong  Encouraged to get back on the counselor's schedule but possibly consider a reduced frequency as weekly may have been too overwhelming for her given all that was coming to light - suggest specifically discussing ways to help overcome a panic attack without relying on medication  Consider trial addition of [place] L-Methylfolate 15 mg daily  Praised her dietary efforts to keep sugar intake low  Reminded how essential a regular exercise routine is to overcoming prediabetes & elevated glucose levels  Continue chronic medications as prescribed  Advised to proceed with shingles vaccine ASAP and plan to get 2nd dose in September  Asked to press on LLQ over the next few days when lying down and if not reproducible then no reason to worry - if it persists despite trying to recreate it after a bowel movement when that portion of the colon would be empty then we may want to order some imaging to evaluate her ovaries      Personalized Preventive Plan for [place] - 6/27/2022  Medicare offers a range of preventive health benefits. Some of the tests and screenings are paid in full while other may be subject to a deductible, co-insurance, and/or copay. Some of these benefits include a comprehensive review of your medical history including lifestyle, illnesses that may run in your family, and various assessments and screenings as appropriate. After reviewing your medical record and screening and assessments performed today your provider may have ordered immunizations, labs, imaging, and/or referrals for you. A list of these orders (if applicable) as well as your Preventive Care list are included within your After Visit Summary for your review.     Other Preventive Recommendations:    · A preventive eye exam performed by an eye specialist is recommended every 1-2 years to screen for glaucoma; cataracts, macular degeneration, and other eye disorders. · A preventive dental visit is recommended every 6 months. · Try to get at least 150 minutes of exercise per week or 10,000 steps per day on a pedometer . · Order or download the FREE \"Exercise & Physical Activity: Your Everyday Guide\" from [employer]. Call 8-653.696.7877 or search [employer] online. · You need 5691-8654 mg of calcium and 0214-6130 IU of vitamin D per day. It is possible to meet your calcium requirement with diet alone, but a vitamin D supplement is usually necessary to meet this goal.  · When exposed to the sun, use a sunscreen that protects against both UVA and UVB radiation with an SPF of 30 or greater. Reapply every 2 to 3 hours or after sweating, drying off with a towel, or swimming. · Always wear a seat belt when traveling in a car. Always wear a helmet when riding a bicycle or motorcycle.

## 2022-08-01 DIAGNOSIS — F51.04 CHRONIC INSOMNIA: ICD-10-CM

## 2022-08-02 RX ORDER — CLONAZEPAM 1 MG/1
TABLET ORAL
Qty: 30 TABLET | OUTPATIENT
Start: 2022-08-02

## 2022-08-04 ENCOUNTER — NURSE ONLY (OUTPATIENT)
Dept: INTERNAL MEDICINE CLINIC | Facility: CLINIC | Age: 77
End: 2022-08-04
Payer: MEDICARE

## 2022-08-04 DIAGNOSIS — R82.90 ABNORMAL FINDING ON URINALYSIS: Primary | ICD-10-CM

## 2022-08-04 DIAGNOSIS — R31.9 HEMATURIA, UNSPECIFIED TYPE: ICD-10-CM

## 2022-08-04 DIAGNOSIS — R82.90 ABNORMAL FINDING ON URINALYSIS: ICD-10-CM

## 2022-08-04 LAB
BILIRUBIN, URINE, POC: NEGATIVE
BLOOD URINE, POC: ABNORMAL
GLUCOSE URINE, POC: NEGATIVE
KETONES, URINE, POC: ABNORMAL
LEUKOCYTE ESTERASE, URINE, POC: ABNORMAL
NITRITE, URINE, POC: NEGATIVE
PH, URINE, POC: 5 (ref 4.6–8)
PROTEIN,URINE, POC: NEGATIVE
SPECIFIC GRAVITY, URINE, POC: 1.03 (ref 1–1.03)
URINALYSIS CLARITY, POC: CLEAR
URINALYSIS COLOR, POC: YELLOW
UROBILINOGEN, POC: 0.2

## 2022-08-04 PROCEDURE — 81003 URINALYSIS AUTO W/O SCOPE: CPT | Performed by: INTERNAL MEDICINE

## 2022-08-04 RX ORDER — NITROFURANTOIN MACROCRYSTALS 100 MG/1
100 CAPSULE ORAL 2 TIMES DAILY
Qty: 14 CAPSULE | Refills: 0 | Status: SHIPPED | OUTPATIENT
Start: 2022-08-04 | End: 2022-08-11

## 2022-08-04 NOTE — PROGRESS NOTES
Orders Placed This Encounter    nitrofurantoin (MACRODANTIN) 100 MG capsule     Sig: Take 1 capsule by mouth in the morning and 1 capsule before bedtime. Do all this for 7 days.      Dispense:  14 capsule     Refill:  Reinier Black MD

## 2022-08-07 LAB
BACTERIA SPEC CULT: ABNORMAL
BACTERIA SPEC CULT: ABNORMAL
SERVICE CMNT-IMP: ABNORMAL

## 2022-08-25 ENCOUNTER — HOSPITAL ENCOUNTER (OUTPATIENT)
Dept: INFUSION THERAPY | Age: 77
Discharge: HOME | End: 2022-08-25
Payer: MEDICARE

## 2022-08-25 ENCOUNTER — HOSPITAL ENCOUNTER (OUTPATIENT)
Dept: LAB | Age: 77
Discharge: HOME | End: 2022-08-28

## 2022-08-25 ENCOUNTER — TELEPHONE (OUTPATIENT)
Dept: INTERNAL MEDICINE CLINIC | Facility: CLINIC | Age: 77
End: 2022-08-25

## 2022-08-25 VITALS
SYSTOLIC BLOOD PRESSURE: 107 MMHG | OXYGEN SATURATION: 97 % | DIASTOLIC BLOOD PRESSURE: 61 MMHG | TEMPERATURE: 97.7 F | RESPIRATION RATE: 18 BRPM | HEART RATE: 77 BPM

## 2022-08-25 DIAGNOSIS — M81.0 AGE-RELATED OSTEOPOROSIS WITHOUT CURRENT PATHOLOGICAL FRACTURE: Primary | ICD-10-CM

## 2022-08-25 LAB
ALBUMIN SERPL-MCNC: 3.1 G/DL (ref 3.2–4.6)
ALBUMIN/GLOB SERPL: 0.9 {RATIO} (ref 1.2–3.5)
ALP SERPL-CCNC: 59 U/L (ref 50–136)
ALT SERPL-CCNC: 32 U/L (ref 12–65)
ANION GAP SERPL CALC-SCNC: 6 MMOL/L (ref 7–16)
AST SERPL-CCNC: 20 U/L (ref 15–37)
BILIRUB SERPL-MCNC: 0.5 MG/DL (ref 0.2–1.1)
BUN SERPL-MCNC: 13 MG/DL (ref 8–23)
CALCIUM SERPL-MCNC: 8.6 MG/DL (ref 8.3–10.4)
CHLORIDE SERPL-SCNC: 109 MMOL/L (ref 98–107)
CO2 SERPL-SCNC: 27 MMOL/L (ref 21–32)
CREAT SERPL-MCNC: 1 MG/DL (ref 0.6–1)
GLOBULIN SER CALC-MCNC: 3.4 G/DL (ref 2.3–3.5)
GLUCOSE SERPL-MCNC: 107 MG/DL (ref 65–100)
POTASSIUM SERPL-SCNC: 3.9 MMOL/L (ref 3.5–5.1)
PROT SERPL-MCNC: 6.5 G/DL (ref 6.3–8.2)
SODIUM SERPL-SCNC: 142 MMOL/L (ref 136–145)

## 2022-08-25 PROCEDURE — 80053 COMPREHEN METABOLIC PANEL: CPT

## 2022-08-25 PROCEDURE — 96372 THER/PROPH/DIAG INJ SC/IM: CPT

## 2022-08-25 PROCEDURE — 6360000002 HC RX W HCPCS: Performed by: INTERNAL MEDICINE

## 2022-08-25 PROCEDURE — 36415 COLL VENOUS BLD VENIPUNCTURE: CPT

## 2022-08-25 RX ORDER — ACETAMINOPHEN 325 MG/1
650 TABLET ORAL
OUTPATIENT
Start: 2023-02-23

## 2022-08-25 RX ORDER — ALBUTEROL SULFATE 90 UG/1
4 AEROSOL, METERED RESPIRATORY (INHALATION) PRN
OUTPATIENT
Start: 2023-02-23

## 2022-08-25 RX ORDER — EPINEPHRINE 1 MG/ML
0.3 INJECTION, SOLUTION, CONCENTRATE INTRAVENOUS PRN
OUTPATIENT
Start: 2022-08-25

## 2022-08-25 RX ORDER — EPINEPHRINE 1 MG/ML
0.3 INJECTION, SOLUTION, CONCENTRATE INTRAVENOUS PRN
OUTPATIENT
Start: 2023-02-23

## 2022-08-25 RX ORDER — SODIUM CHLORIDE 9 MG/ML
INJECTION, SOLUTION INTRAVENOUS CONTINUOUS
OUTPATIENT
Start: 2022-08-25

## 2022-08-25 RX ORDER — DIPHENHYDRAMINE HYDROCHLORIDE 50 MG/ML
50 INJECTION INTRAMUSCULAR; INTRAVENOUS
OUTPATIENT
Start: 2022-08-25

## 2022-08-25 RX ORDER — ACETAMINOPHEN 325 MG/1
650 TABLET ORAL
OUTPATIENT
Start: 2022-08-25

## 2022-08-25 RX ORDER — DIPHENHYDRAMINE HYDROCHLORIDE 50 MG/ML
50 INJECTION INTRAMUSCULAR; INTRAVENOUS
OUTPATIENT
Start: 2023-02-23

## 2022-08-25 RX ORDER — ONDANSETRON 2 MG/ML
8 INJECTION INTRAMUSCULAR; INTRAVENOUS
OUTPATIENT
Start: 2023-02-23

## 2022-08-25 RX ORDER — SODIUM CHLORIDE 9 MG/ML
INJECTION, SOLUTION INTRAVENOUS CONTINUOUS
OUTPATIENT
Start: 2023-02-23

## 2022-08-25 RX ORDER — ALBUTEROL SULFATE 90 UG/1
4 AEROSOL, METERED RESPIRATORY (INHALATION) PRN
OUTPATIENT
Start: 2022-08-25

## 2022-08-25 RX ORDER — ONDANSETRON 2 MG/ML
8 INJECTION INTRAMUSCULAR; INTRAVENOUS
OUTPATIENT
Start: 2022-08-25

## 2022-08-25 RX ADMIN — DENOSUMAB 60 MG: 60 INJECTION SUBCUTANEOUS at 11:52

## 2022-08-25 NOTE — PROGRESS NOTES
Arrived to the [facility]. Prolia injection completed. Patient instructed to report any side affects to ordering provider. Patient tolerated well. Any issues or concerns during appointment: none. Discharged ambulatory.

## 2022-09-16 ENCOUNTER — TELEPHONE (OUTPATIENT)
Dept: INTERNAL MEDICINE CLINIC | Facility: CLINIC | Age: 77
End: 2022-09-16

## 2022-09-16 NOTE — TELEPHONE ENCOUNTER
----- Message from Colleen Mendoza sent at 9/16/2022  3:43 PM EDT -----  Subject: Message to Provider    QUESTIONS  Information for Provider? Pt was scheduled for 10/25/22 after a   cancellation was made for her appt on 10/31/22. This makes her lab appt   only a day before the 4 mo f/u appt (10/24/22). She is wanting to know if   this window is too small or will results be there? Can you call and   advise? She is willing to move lab appt up.  ---------------------------------------------------------------------------  --------------  [id number]  [phone]; OK to leave message on voicemail  ---------------------------------------------------------------------------  --------------  SCRIPT ANSWERS  Relationship to Patient?  Self

## 2022-09-16 NOTE — TELEPHONE ENCOUNTER
----- Message from Romelia Cruzjavon sent at 9/16/2022  3:46 PM EDT -----  Subject: Message to Provider    QUESTIONS  Information for Provider? In addition to last message since my system is   acting up, Can you let her know if she needs an order for the 2nd shingles   vaccine? If she does can you also order it for her.  ---------------------------------------------------------------------------  --------------  Nhi Corado INFO  [phone]; OK to leave message on voicemail  ---------------------------------------------------------------------------  --------------  SCRIPT ANSWERS  Relationship to Patient?  Self

## 2022-09-16 NOTE — TELEPHONE ENCOUNTER
----- Message from Keven Ponce sent at 9/16/2022  3:46 PM EDT -----  Subject: Message to Provider    QUESTIONS  Information for Provider? In addition to last message since my system is   acting up, Can you let her know if she needs an order for the 2nd shingles   vaccine? If she does can you also order it for her.  ---------------------------------------------------------------------------  --------------  Afua ACKERMAN  [phone]; OK to leave message on voicemail  ---------------------------------------------------------------------------  --------------  SCRIPT ANSWERS  Relationship to Patient?  Self

## 2022-09-19 NOTE — TELEPHONE ENCOUNTER
She shouldn't need a new order if she's already started the vaccine series. It appears that she received the first on 7/1/22. Did pharmacy tell her they needed something from us?

## 2022-09-20 NOTE — TELEPHONE ENCOUNTER
EULALIOM on 9/20/22 @ 7:28 AM to inform pt that she should not need a new order for the 2nd shingles vaccine since she has already started the series and to return my call if the pharmacy is requesting a new order.

## 2022-10-17 ENCOUNTER — NURSE ONLY (OUTPATIENT)
Dept: INTERNAL MEDICINE CLINIC | Facility: CLINIC | Age: 77
End: 2022-10-17

## 2022-10-17 DIAGNOSIS — N28.9 RENAL INSUFFICIENCY: ICD-10-CM

## 2022-10-17 DIAGNOSIS — E78.5 DYSLIPIDEMIA: ICD-10-CM

## 2022-10-17 DIAGNOSIS — E55.9 VITAMIN D DEFICIENCY: ICD-10-CM

## 2022-10-17 DIAGNOSIS — R73.01 IFG (IMPAIRED FASTING GLUCOSE): ICD-10-CM

## 2022-10-17 DIAGNOSIS — E87.8 ELECTROLYTE ABNORMALITY: ICD-10-CM

## 2022-10-17 DIAGNOSIS — E03.9 HYPOTHYROIDISM, UNSPECIFIED TYPE: ICD-10-CM

## 2022-10-18 LAB
25(OH)D3 SERPL-MCNC: 54.8 NG/ML (ref 30–100)
ALBUMIN SERPL-MCNC: 3.4 G/DL (ref 3.2–4.6)
ALBUMIN/GLOB SERPL: 1.3 {RATIO} (ref 0.4–1.6)
ALP SERPL-CCNC: 47 U/L (ref 50–136)
ALT SERPL-CCNC: 37 U/L (ref 12–65)
ANION GAP SERPL CALC-SCNC: 5 MMOL/L (ref 2–11)
AST SERPL-CCNC: 17 U/L (ref 15–37)
BILIRUB SERPL-MCNC: 0.7 MG/DL (ref 0.2–1.1)
BUN SERPL-MCNC: 20 MG/DL (ref 8–23)
CALCIUM SERPL-MCNC: 8.9 MG/DL (ref 8.3–10.4)
CHLORIDE SERPL-SCNC: 109 MMOL/L (ref 101–110)
CHOLEST SERPL-MCNC: 194 MG/DL
CO2 SERPL-SCNC: 28 MMOL/L (ref 21–32)
CREAT SERPL-MCNC: 1 MG/DL (ref 0.6–1)
EST. AVERAGE GLUCOSE BLD GHB EST-MCNC: 128 MG/DL
GLOBULIN SER CALC-MCNC: 2.6 G/DL (ref 2.8–4.5)
GLUCOSE SERPL-MCNC: 98 MG/DL (ref 65–100)
HBA1C MFR BLD: 6.1 % (ref 4.8–5.6)
HDLC SERPL-MCNC: 62 MG/DL (ref 40–60)
HDLC SERPL: 3.1 {RATIO}
LDLC SERPL CALC-MCNC: 117 MG/DL
POTASSIUM SERPL-SCNC: 4.5 MMOL/L (ref 3.5–5.1)
PROT SERPL-MCNC: 6 G/DL (ref 6.3–8.2)
SODIUM SERPL-SCNC: 142 MMOL/L (ref 133–143)
TRIGL SERPL-MCNC: 75 MG/DL (ref 35–150)
TSH, 3RD GENERATION: 1.44 UIU/ML (ref 0.36–3.74)
VLDLC SERPL CALC-MCNC: 15 MG/DL (ref 6–23)

## 2022-10-25 ENCOUNTER — PATIENT MESSAGE (OUTPATIENT)
Dept: INTERNAL MEDICINE CLINIC | Facility: CLINIC | Age: 77
End: 2022-10-25

## 2022-10-25 ENCOUNTER — OFFICE VISIT (OUTPATIENT)
Dept: INTERNAL MEDICINE CLINIC | Facility: CLINIC | Age: 77
End: 2022-10-25
Payer: MEDICARE

## 2022-10-25 VITALS
DIASTOLIC BLOOD PRESSURE: 86 MMHG | TEMPERATURE: 97.7 F | HEART RATE: 80 BPM | HEIGHT: 64 IN | WEIGHT: 231 LBS | OXYGEN SATURATION: 97 % | SYSTOLIC BLOOD PRESSURE: 120 MMHG | BODY MASS INDEX: 39.44 KG/M2

## 2022-10-25 DIAGNOSIS — N18.31 STAGE 3A CHRONIC KIDNEY DISEASE (HCC): ICD-10-CM

## 2022-10-25 DIAGNOSIS — F32.9 REACTIVE DEPRESSION (SITUATIONAL): ICD-10-CM

## 2022-10-25 DIAGNOSIS — R19.7 DIARRHEA, UNSPECIFIED TYPE: ICD-10-CM

## 2022-10-25 DIAGNOSIS — Z12.11 SCREENING FOR COLON CANCER: ICD-10-CM

## 2022-10-25 DIAGNOSIS — R73.01 IFG (IMPAIRED FASTING GLUCOSE): ICD-10-CM

## 2022-10-25 DIAGNOSIS — F41.1 GENERALIZED ANXIETY DISORDER: ICD-10-CM

## 2022-10-25 DIAGNOSIS — F43.21 COMPLICATED GRIEF: ICD-10-CM

## 2022-10-25 DIAGNOSIS — E55.9 VITAMIN D DEFICIENCY: ICD-10-CM

## 2022-10-25 DIAGNOSIS — E78.5 DYSLIPIDEMIA: ICD-10-CM

## 2022-10-25 DIAGNOSIS — E03.9 HYPOTHYROIDISM, UNSPECIFIED TYPE: Primary | ICD-10-CM

## 2022-10-25 DIAGNOSIS — Z86.010 HISTORY OF COLON POLYPS: ICD-10-CM

## 2022-10-25 DIAGNOSIS — R19.4 CHANGE IN BOWEL HABITS: ICD-10-CM

## 2022-10-25 PROCEDURE — 1036F TOBACCO NON-USER: CPT | Performed by: PHYSICIAN ASSISTANT

## 2022-10-25 PROCEDURE — G8484 FLU IMMUNIZE NO ADMIN: HCPCS | Performed by: PHYSICIAN ASSISTANT

## 2022-10-25 PROCEDURE — G8399 PT W/DXA RESULTS DOCUMENT: HCPCS | Performed by: PHYSICIAN ASSISTANT

## 2022-10-25 PROCEDURE — 99215 OFFICE O/P EST HI 40 MIN: CPT | Performed by: PHYSICIAN ASSISTANT

## 2022-10-25 PROCEDURE — G8417 CALC BMI ABV UP PARAM F/U: HCPCS | Performed by: PHYSICIAN ASSISTANT

## 2022-10-25 PROCEDURE — 1090F PRES/ABSN URINE INCON ASSESS: CPT | Performed by: PHYSICIAN ASSISTANT

## 2022-10-25 PROCEDURE — 1123F ACP DISCUSS/DSCN MKR DOCD: CPT | Performed by: PHYSICIAN ASSISTANT

## 2022-10-25 PROCEDURE — G8427 DOCREV CUR MEDS BY ELIG CLIN: HCPCS | Performed by: PHYSICIAN ASSISTANT

## 2022-10-25 PROCEDURE — 3017F COLORECTAL CA SCREEN DOC REV: CPT | Performed by: PHYSICIAN ASSISTANT

## 2022-10-25 ASSESSMENT — ENCOUNTER SYMPTOMS
DIARRHEA: 1
ABDOMINAL PAIN: 0
CONSTIPATION: 0
SHORTNESS OF BREATH: 0
VOMITING: 0
NAUSEA: 0
ABDOMINAL DISTENTION: 0
BLOOD IN STOOL: 0

## 2022-10-25 ASSESSMENT — PATIENT HEALTH QUESTIONNAIRE - PHQ9
SUM OF ALL RESPONSES TO PHQ QUESTIONS 1-9: 0
SUM OF ALL RESPONSES TO PHQ QUESTIONS 1-9: 0
SUM OF ALL RESPONSES TO PHQ9 QUESTIONS 1 & 2: 0
2. FEELING DOWN, DEPRESSED OR HOPELESS: 0
SUM OF ALL RESPONSES TO PHQ QUESTIONS 1-9: 0
1. LITTLE INTEREST OR PLEASURE IN DOING THINGS: 0
SUM OF ALL RESPONSES TO PHQ QUESTIONS 1-9: 0

## 2022-10-25 NOTE — TELEPHONE ENCOUNTER
From: Marvin Jackson  To: Sana Hernandez  Sent: 10/25/2022 12:57 PM EDT  Subject: Shingles vaccine    For your records:  I got my first shingles shot on July 1, 2022  I got my second shingles shot on September 28,2022  Thanks.  Yazan George

## 2022-10-25 NOTE — PROGRESS NOTES
Sagar Chilel (:  1945) is a 78 y.o. female,Established patient, here for evaluation of the following chief complaint(s):  Hypothyroidism, Blood Sugar Problem, and Cholesterol Problem         ASSESSMENT/PLAN:  1. Generalized anxiety disorder  The following orders have not been finalized:  -     LORazepam (ATIVAN) 1 MG tablet  2. Complicated grief  The following orders have not been finalized:  -     LORazepam (ATIVAN) 1 MG tablet      No follow-ups on file. Subjective   SUBJECTIVE/OBJECTIVE:  HPI    Review of Systems   Constitutional:  Negative for fatigue and unexpected weight change. Eyes:  Negative for visual disturbance. Respiratory:  Negative for shortness of breath. Cardiovascular:  Negative for chest pain, palpitations and leg swelling. Gastrointestinal:  Positive for diarrhea (Loose stools since August.). Negative for constipation. Endocrine: Negative for cold intolerance, heat intolerance, polydipsia and polyuria. Negative for hair loss. Musculoskeletal:  Negative for myalgias. Neurological:  Negative for numbness. Psychiatric/Behavioral:  Positive for sleep disturbance. Objective   Physical Exam       On this date 10/25/2022 I have spent *** minutes reviewing previous notes, test results and face to face with the patient discussing the diagnosis and importance of compliance with the treatment plan as well as documenting on the day of the visit. An electronic signature was used to authenticate this note.     --Saida Del Cid MA

## 2022-10-25 NOTE — PROGRESS NOTES
Mulugeta Son (:  [birth year]) is a 78 y.o. female,Established patient, here for evaluation of the following chief complaint(s):  Follow-up (Elevated Glucose, Hyperlipidemia, Vit D Def, Hypothyroidism, Depression)         ASSESSMENT/PLAN:  1. Hypothyroidism, unspecified type  -     TSH; Future  2. Generalized anxiety disorder  -     LORazepam (ATIVAN) 1 MG tablet; Take 1 tablet by mouth 2 times daily as needed for Anxiety for up to 30 days. , Disp-30 tablet, R-0Normal  3. Complicated grief  -     LORazepam (ATIVAN) 1 MG tablet; Take 1 tablet by mouth 2 times daily as needed for Anxiety for up to 30 days. , Disp-30 tablet, R-0Normal  4. Screening for colon cancer  -     ERIK - Jaky Dawson MD, Gastroenterology  5. Change in bowel habits  -     ERIK Dawson MD, Gastroenterology  6. History of colon polyps  -     ERIK - Jaky Dawson MD, Gastroenterology  7. Stage 3a chronic kidney disease (HCC)  -     CBC with Auto Differential; Future  -     Comprehensive Metabolic Panel; Future  8. Diarrhea, unspecified type  -     diphenoxylate-atropine (DIPHENATOL) 2.5-0.025 MG per tablet; Take 1 tablet by mouth 4 times daily as needed for Diarrhea for up to 10 days. , Disp-30 tablet, R-0Normal  9. Reactive depression (situational)  10. Dyslipidemia  -     Comprehensive Metabolic Panel; Future  -     Lipid Panel; Future  11. IFG (impaired fasting glucose)  -     Comprehensive Metabolic Panel; Future  -     Hemoglobin A1C; Future  12. Vitamin D deficiency      Patient Instructions   Reminded of previous recommendation to try adding L-Methylfolate 15 mg daily from [address]   Trial of Lomotil with instruction for only as needed use  Emphasized the importance of more balanced healthier meals even if not her usual type of meal - examples include Glucerna or Boost Glucose Control, snack packs with nuts, cheese, & dried fruit, greek yogurt, nuts, hard boiled eggs, etc  Encouraged to stay well hydrated with plenty of water  Recommend getting newest COVID vaccine given formulation that should better protect against the most recently circulating Omicron variant  Continue chronic medications as prescribed  Recommend annual flu vaccination but unfortunately we are out of flu shots currently so she will need to obtain from local pharmacy      Return in about 4 months (around 2/25/2023) for routine f/u. Subjective   SUBJECTIVE/OBJECTIVE:  HPI  Patient is here for follow-up of elevated fasting glucose. The current state of this condition is asymptomatic with mixed control - fasting glucose improved but glucose average increased - not currently on medications for this problem. She admits to unusual dietary practices recently since boyfriend's appetite/taste for certain foods has changed she finds herself eating a lot of snack/light foods and sandwiches. Hemoglobin A1C   Date Value Ref Range Status   10/17/2022 6.1 (H) 4.8 - 5.6 % Final       The patient is a 78 y.o. female who is seen for evaluation of hyperlipidemia. She was tested because of dyslipidemia. The current state of this condition is no significant medication side effects noted and reasonably well controlled on Crestor 10 mg q hs - taking as prescribed.        Last Lipid Panel:   Lab Results   Component Value Date    CHOL 194 10/17/2022    CHOL 163 06/14/2022    CHOL 174 02/14/2022     Lab Results   Component Value Date    TRIG 75 10/17/2022    TRIG 151 (H) 06/14/2022    TRIG 129 02/14/2022     Lab Results   Component Value Date    HDL 62 (H) 10/17/2022    HDL 51 06/14/2022    HDL 56 02/14/2022     Lab Results   Component Value Date    LDLCALC 117 (H) 10/17/2022    LDLCALC 81.8 06/14/2022    LDLCALC 95 02/14/2022     Lab Results   Component Value Date    LABVLDL 15 10/17/2022    LABVLDL 30.2 (H) 06/14/2022    LABVLDL 29.2 (H) 05/04/2020    VLDL 23 02/14/2022    VLDL 24 11/16/2021    VLDL 21 05/10/2021     Lab Results   Component Value Date    CHOLHDLRATIO 3.1 10/17/2022    CHOLHDLRATIO 3.2 06/14/2022    CHOLHDLRATIO 3.3 05/04/2020       Patient is here for follow-up of vitamin d deficiency. The current state of this condition is no significant medication side effects noted and well controlled on OTC vitamin d supplement bid - taking as prescribed. Vit D, 25-Hydroxy   Date Value Ref Range Status   10/17/2022 54.8 30.0 - 100.0 ng/mL Final   06/14/2022 69.7 30.0 - 100.0 ng/mL Final   02/14/2022 73.1 30.0 - 100.0 ng/mL Final     Comment:     Vitamin D deficiency has been defined by the [address] practice guideline as a  level of serum 25-OH vitamin D less than 20 ng/mL (1,2). The Endocrine Society went on to further define vitamin D  insufficiency as a level between 21 and 29 ng/mL (2). 1. IOM ([place] [employer]). 2010. Dietary reference     intakes for calcium and D. [facility]: The     [employer]. 2. Maris MF, Sushma PABON, Shauna GOLDBERG, et al.     Evaluation, treatment, and prevention of vitamin D     deficiency: an Endocrine Society clinical practice     guideline. JCEM. 2011 Jul; 96(7):1911-30. The patient is a 78 y.o. female who is seen for follow up of hypothyroidism. Current symptoms: diarrhea and weight changes. Symptoms are began worsening 2 months ago. The patient is following treatment regimen with good compliance. Current treatment regimen consists of Synthroid 50 mcg daily and is  taking it first thing in the AM on an empty stomach with no other food/liquids/other medications for at least 45-60 minutes.     Lab Results   Component Value Date    GVR0BHV 1.440 10/17/2022    ERR9NUV 1.930 06/14/2022    TSH 3.650 02/14/2022    TSH 2.350 11/16/2021    TSH 6.360 (H) 08/11/2021     Lab Results   Component Value Date    T4FREE 1.15 10/17/2019    T4FREE 1.20 08/27/2019    T4FREE 1.08 07/15/2019     No results found for: TGAB      Patient is here for follow-up of anxiety with depression. The current state of this condition is no significant medication side effects noted and poorly controlled on Lexapro 20 mg daily + Ativan 1 mg prn (using 3-4 days/month due to concern for risk of overusing) - taking as prescribed. Current prescription was filled 6/27/22 for #30 with no refills. She reports increased anxiety since boyfriend was diagnosed with bladder cancer in August.  He is scheduled for surgery in November. Patient is here for follow-up of chronic kidney disease. The current state of this condition is asymptomatic and improved - not currently on medications for this problem. She has been gradually decreasing use of Zorvolex and making efforts to stay well hydrated with water. Lab Results   Component Value Date    CREATININE 1.00 10/17/2022    BUN 20 10/17/2022     10/17/2022    K 4.5 10/17/2022     10/17/2022    CO2 28 10/17/2022       Additional concerns addressed today include diarrhea x 2 months. She describes looser Bms starting in August around the same time her boyfriend was diagnosed with cancer and gradually progressed to diarrhea. Treatments tried include Imodium which helped initially and was able to get by with about 1 dose/week but gradually started requiring more frequent dosing to keep symptoms manageable. Her last colonoscopy was done 11/28/17 with findings of a hyperplastic polyp. Review of Systems   Constitutional:  Positive for appetite change (reduced), fatigue and unexpected weight change. Eyes:  Negative for visual disturbance. Respiratory:  Negative for shortness of breath. Cardiovascular:  Negative for chest pain, palpitations and leg swelling. Gastrointestinal:  Positive for diarrhea. Negative for abdominal distention, abdominal pain, blood in stool, nausea and vomiting. Negative for melena or heartburn   Endocrine: Negative for cold intolerance, heat intolerance and polydipsia.         Negative for hair loss   Genitourinary:  Negative for frequency. Musculoskeletal:  Positive for myalgias (occasional leg cramps). Neurological:  Negative for dizziness, numbness and headaches. Psychiatric/Behavioral:  Positive for dysphoric mood (worsened with situational stress) and sleep disturbance. The patient is nervous/anxious (increased since boyfriend diagnosed with bladder cancer). /86 (Site: Left Upper Arm, Position: Sitting, Cuff Size: Large Adult)   Pulse 80   Temp 97.7 °F (36.5 °C) (Temporal)   Ht 5' 3.5\" (1.613 m)   Wt 231 lb (104.8 kg)   SpO2 97%   BMI 40.28 kg/m²       Objective   Physical Exam  Constitutional:       Appearance: Normal appearance. HENT:      Head: Normocephalic and atraumatic. Eyes:      Conjunctiva/sclera: Conjunctivae normal.      Pupils: Pupils are equal, round, and reactive to light. Neck:      Vascular: No carotid bruit. Cardiovascular:      Rate and Rhythm: Normal rate and regular rhythm. Heart sounds: Normal heart sounds. Pulmonary:      Effort: Pulmonary effort is normal.      Breath sounds: Normal breath sounds. Musculoskeletal:         General: Normal range of motion. Cervical back: Normal range of motion. Skin:     General: Skin is warm and dry. Neurological:      Mental Status: She is alert and oriented to person, place, and time. Psychiatric:         Mood and Affect: Mood normal.         Behavior: Behavior normal.         Thought Content: Thought content normal.         Judgment: Judgment normal.          On this date 10/25/2022 I have spent 50 minutes reviewing previous notes, test results and face to face with the patient discussing the diagnosis and importance of compliance with the treatment plan as well as documenting on the day of the visit. An electronic signature was used to authenticate this note.     --Chelsea Rogers PA-C

## 2022-10-26 ENCOUNTER — PATIENT MESSAGE (OUTPATIENT)
Dept: INTERNAL MEDICINE CLINIC | Facility: CLINIC | Age: 77
End: 2022-10-26

## 2022-10-26 DIAGNOSIS — F41.9 ANXIETY DISORDER, UNSPECIFIED TYPE: Primary | ICD-10-CM

## 2022-10-26 RX ORDER — ESCITALOPRAM OXALATE 20 MG/1
20 TABLET ORAL DAILY
Qty: 90 TABLET | Refills: 3 | Status: SHIPPED | OUTPATIENT
Start: 2022-10-26

## 2022-10-26 RX ORDER — LORAZEPAM 1 MG/1
1 TABLET ORAL 2 TIMES DAILY PRN
Qty: 30 TABLET | Refills: 0 | Status: SHIPPED | OUTPATIENT
Start: 2022-10-26 | End: 2022-11-25

## 2022-10-26 RX ORDER — DIPHENOXYLATE HYDROCHLORIDE AND ATROPINE SULFATE 2.5; .025 MG/1; MG/1
1 TABLET ORAL 4 TIMES DAILY PRN
Qty: 30 TABLET | Refills: 0 | Status: SHIPPED | OUTPATIENT
Start: 2022-10-26 | End: 2022-11-29 | Stop reason: SDUPTHER

## 2022-10-26 NOTE — TELEPHONE ENCOUNTER
From: Vick Byrnes  To: Lanette Yareli  Sent: 10/26/2022 12:30 AM EDT  Subject: Escitalopram 20 mg    I need a refill for my Escitalopram. Unless you sent it in today I have no refills left. On MyChart I am told that it cannot be refilled through [address] I have enough for a couple of weeks but then its gone. So sorry I forgot my pill bottles today. This might have been taken care of then. Georgie Goodpasture

## 2022-11-11 ENCOUNTER — TELEMEDICINE (OUTPATIENT)
Dept: INTERNAL MEDICINE CLINIC | Facility: CLINIC | Age: 77
End: 2022-11-11
Payer: MEDICARE

## 2022-11-11 DIAGNOSIS — J11.00 INFLUENZA AND PNEUMONIA: Primary | ICD-10-CM

## 2022-11-11 PROCEDURE — 99442 PR PHYS/QHP TELEPHONE EVALUATION 11-20 MIN: CPT | Performed by: NURSE PRACTITIONER

## 2022-11-11 RX ORDER — LEVOFLOXACIN 500 MG/1
500 TABLET, FILM COATED ORAL DAILY
Qty: 7 TABLET | Refills: 0 | Status: SHIPPED | OUTPATIENT
Start: 2022-11-11 | End: 2022-11-18

## 2022-11-11 ASSESSMENT — ENCOUNTER SYMPTOMS
CONSTIPATION: 0
ABDOMINAL PAIN: 0
SINUS PAIN: 0
APNEA: 0
EYE ITCHING: 0
NAUSEA: 0
COUGH: 1
EYE REDNESS: 0
DIARRHEA: 0
EYE PAIN: 0
COLOR CHANGE: 0
ABDOMINAL DISTENTION: 0
EYE DISCHARGE: 0
SHORTNESS OF BREATH: 0
BACK PAIN: 0

## 2022-11-11 NOTE — PROGRESS NOTES
[unfilled]  [address] [place] [id number]      PROGRESS NOTE    SUBJECTIVE:   Shaggy Fraire is a 78 y.o. female seen for a follow up visit regarding the following chief complaint:     Chief Complaint   Patient presents with    Medication Reaction       HPI    Patient presents with concerns of antibiotic intolerance. She was seen by urgent care for flu and pneumonia and was prescribed doxycycline and Mucinex. She was also prescribed Tamiflu but was not able to take this in time. She is having diarrhea that occurs directly following doxycycline and is not tolerating this. Requesting new antibiotic for pneumonia. Current Outpatient Medications   Medication Sig Dispense Refill    levoFLOXacin (LEVAQUIN) 500 MG tablet Take 1 tablet by mouth daily for 7 days 7 tablet 0    LORazepam (ATIVAN) 1 MG tablet Take 1 tablet by mouth 2 times daily as needed for Anxiety for up to 30 days. 30 tablet 0    escitalopram (LEXAPRO) 20 MG tablet Take 1 tablet by mouth daily 90 tablet 3    Cyanocobalamin (VITAMIN B 12 PO) Take 1 tablet by mouth daily      Cranberry 125 MG TABS Take 450 mg by mouth daily      Multiple Vitamins-Minerals (MULTIVITAMIN ADULTS PO) Take 1 tablet by mouth daily      LACTOBACILLUS BIFIDUS PO Take 1 capsule by mouth      tretinoin (RETIN-A) 0.025 % cream APPLY SMALL AMOUNT TOPICALLY TO DRY FACE EVERY NIGHT      clonazePAM (KLONOPIN) 1 MG tablet Take 1 tablet by mouth nightly for 30 days.  30 tablet 5    ZORVOLEX 35 MG CAPS Take 1 capsule by mouth 3 times daily as needed (joint pain) Brand medically necessary! 90 capsule 11    ascorbic acid (VITAMIN C) 1000 MG tablet Take by mouth (Patient not taking: Reported on 10/25/2022)      Biotin 2.5 MG CAPS Take by mouth      denosumab (PROLIA) 60 MG/ML SOSY SC injection Inject 60 mg into the skin      famotidine (PEPCID) 20 MG tablet Take 20 mg by mouth 2 times daily      fluticasone (CUTIVATE) 0.05 % cream Apply topically 2 times daily fluticasone (FLONASE) 50 MCG/ACT nasal spray 2 sprays by Nasal route daily      latanoprost (XALATAN) 0.005 % ophthalmic solution INSTILL 1 DROP IN BOTH EYES AT NIGHT      levothyroxine (SYNTHROID) 50 MCG tablet Take 50 mcg by mouth every morning (before breakfast)      melatonin 1 MG tablet Take 2 mg by mouth      rosuvastatin (CRESTOR) 10 MG tablet Take 10 mg by mouth       No current facility-administered medications for this visit. Allergies   Allergen Reactions    Penicillins Hives    Cefprozil Nausea And Vomiting    Erythromycin Rash       Past Medical History:   Diagnosis Date    Anxiety     Situational    Chronic insomnia 5/20/2013    Diverticulosis 5/11/2006    Dyslipidemia 5/20/2013    Eczema     Environmental and seasonal allergies     GERD (gastroesophageal reflux disease)     Hypothyroidism     Internal hemorrhoids 5/11/2006    Open-angle glaucoma 07/2019    Bilateral - Mild    Osteoarthritis of both knees     Osteopenia of both hips 07/08/2019    Pneumonia 04/29/2019    Right    Vitamin D deficiency 5/20/2013     Past Surgical History:   Procedure Laterality Date    BLADDER REPAIR  09/17/2013    mesh sling    CATARACT REMOVAL Bilateral 10/14/2019 & 10/28/19    COLONOSCOPY  11/28/2017    Hyperplastic Polyp    TONSILLECTOMY      TOTAL KNEE ARTHROPLASTY Left 12/17/2019    TOTAL KNEE ARTHROPLASTY Right 07/21/2020     Family History   Problem Relation Age of Onset    Heart Failure Mother     High Cholesterol Mother     High Cholesterol Father     Dementia Father     Arrhythmia Brother         Atrial Fibrillation    Breast Cancer Neg Hx      Social History     Tobacco Use    Smoking status: Never    Smokeless tobacco: Never   Substance Use Topics    Alcohol use: Yes     Alcohol/week: 1.0 - 2.0 standard drink     Types: 1 - 2 Glasses of wine per week       Review of Systems   Constitutional:  Positive for fatigue. Negative for activity change, appetite change, chills and fever.    HENT:  Positive for congestion. Negative for ear pain and sinus pain. Eyes:  Negative for pain, discharge, redness and itching. Respiratory:  Positive for cough. Negative for apnea and shortness of breath. Cardiovascular:  Negative for chest pain, palpitations and leg swelling. Gastrointestinal:  Negative for abdominal distention, abdominal pain, constipation, diarrhea and nausea. Endocrine: Negative for cold intolerance and heat intolerance. Genitourinary:  Negative for difficulty urinating, dysuria, enuresis and urgency. Musculoskeletal:  Negative for arthralgias, back pain, joint swelling, myalgias and neck pain. Skin:  Negative for color change and rash. Neurological:  Negative for dizziness, weakness and headaches. Psychiatric/Behavioral:  Negative for behavioral problems and sleep disturbance. The patient is not nervous/anxious. OBJECTIVE:  There were no vitals taken for this visit. Physical Exam  NA    ASSESSMENT and PLAN  Riaz Garrett was seen today for medication reaction. Diagnoses and all orders for this visit:    Influenza and pneumonia  -     levoFLOXacin (LEVAQUIN) 500 MG tablet; Take 1 tablet by mouth daily for 7 days    Start on levaquin; discontinue doxycycline; push fluids and rest.      Pursuant to the emergency declaration under the [facility] waiver authority and the [employer] Act, this Virtual Visit was conducted, with patient's consent, to reduce the patient's risk of exposure to COVID-19 and provide continuity of care for an established patient. Services were provided through a telephone synchronous discussion virtually to substitute for in-person clinic visit. Patient consented to virtual visit.   Greater than 50% of this 15 min visit was spent counseling the patient about test results, prognosis, importance of compliance, education about disease process, benefits of medications, instructions for management of acute symptoms, and follow up plans. Follow-up and Dispositions    Return for recheck in 2 weeks .          Kalyani Plasencia NP, APRN - CNP

## 2022-11-18 DIAGNOSIS — J30.2 SEASONAL ALLERGIC RHINITIS, UNSPECIFIED TRIGGER: Primary | ICD-10-CM

## 2022-11-18 RX ORDER — FLUTICASONE PROPIONATE 50 MCG
2 SPRAY, SUSPENSION (ML) NASAL DAILY
Qty: 1 EACH | Refills: 5 | Status: SHIPPED | OUTPATIENT
Start: 2022-11-18

## 2022-11-28 ENCOUNTER — OFFICE VISIT (OUTPATIENT)
Dept: INTERNAL MEDICINE CLINIC | Facility: CLINIC | Age: 77
End: 2022-11-28
Payer: MEDICARE

## 2022-11-28 ENCOUNTER — PATIENT MESSAGE (OUTPATIENT)
Dept: INTERNAL MEDICINE CLINIC | Facility: CLINIC | Age: 77
End: 2022-11-28

## 2022-11-28 VITALS
SYSTOLIC BLOOD PRESSURE: 117 MMHG | OXYGEN SATURATION: 95 % | WEIGHT: 230 LBS | BODY MASS INDEX: 39.27 KG/M2 | TEMPERATURE: 97 F | RESPIRATION RATE: 16 BRPM | HEIGHT: 64 IN | HEART RATE: 92 BPM | DIASTOLIC BLOOD PRESSURE: 78 MMHG

## 2022-11-28 DIAGNOSIS — R19.7 DIARRHEA, UNSPECIFIED TYPE: ICD-10-CM

## 2022-11-28 DIAGNOSIS — J10.00 PNEUMONIA DUE TO INFLUENZA A VIRUS: Primary | ICD-10-CM

## 2022-11-28 PROCEDURE — G8417 CALC BMI ABV UP PARAM F/U: HCPCS | Performed by: NURSE PRACTITIONER

## 2022-11-28 PROCEDURE — 3017F COLORECTAL CA SCREEN DOC REV: CPT | Performed by: NURSE PRACTITIONER

## 2022-11-28 PROCEDURE — G8399 PT W/DXA RESULTS DOCUMENT: HCPCS | Performed by: NURSE PRACTITIONER

## 2022-11-28 PROCEDURE — 1123F ACP DISCUSS/DSCN MKR DOCD: CPT | Performed by: NURSE PRACTITIONER

## 2022-11-28 PROCEDURE — 99213 OFFICE O/P EST LOW 20 MIN: CPT | Performed by: NURSE PRACTITIONER

## 2022-11-28 PROCEDURE — 1090F PRES/ABSN URINE INCON ASSESS: CPT | Performed by: NURSE PRACTITIONER

## 2022-11-28 PROCEDURE — 1036F TOBACCO NON-USER: CPT | Performed by: NURSE PRACTITIONER

## 2022-11-28 PROCEDURE — G8427 DOCREV CUR MEDS BY ELIG CLIN: HCPCS | Performed by: NURSE PRACTITIONER

## 2022-11-28 PROCEDURE — G8484 FLU IMMUNIZE NO ADMIN: HCPCS | Performed by: NURSE PRACTITIONER

## 2022-11-28 RX ORDER — BENZONATATE 200 MG/1
200 CAPSULE ORAL 3 TIMES DAILY PRN
Qty: 20 CAPSULE | Refills: 0 | Status: SHIPPED | OUTPATIENT
Start: 2022-11-28 | End: 2022-12-05

## 2022-11-28 RX ORDER — DIPHENOXYLATE HYDROCHLORIDE AND ATROPINE SULFATE 2.5; .025 MG/1; MG/1
1 TABLET ORAL 4 TIMES DAILY PRN
COMMUNITY
End: 2022-11-29 | Stop reason: ALTCHOICE

## 2022-11-28 ASSESSMENT — PATIENT HEALTH QUESTIONNAIRE - PHQ9
SUM OF ALL RESPONSES TO PHQ QUESTIONS 1-9: 0
SUM OF ALL RESPONSES TO PHQ QUESTIONS 1-9: 0
1. LITTLE INTEREST OR PLEASURE IN DOING THINGS: 0
SUM OF ALL RESPONSES TO PHQ QUESTIONS 1-9: 0
SUM OF ALL RESPONSES TO PHQ9 QUESTIONS 1 & 2: 0
SUM OF ALL RESPONSES TO PHQ QUESTIONS 1-9: 0
2. FEELING DOWN, DEPRESSED OR HOPELESS: 0

## 2022-11-28 ASSESSMENT — ENCOUNTER SYMPTOMS
VOMITING: 0
SHORTNESS OF BREATH: 0
SORE THROAT: 0
DIARRHEA: 1
COUGH: 1
SINUS PRESSURE: 0
RHINORRHEA: 1
WHEEZING: 0
SINUS PAIN: 0
ABDOMINAL PAIN: 0
NAUSEA: 0

## 2022-11-28 NOTE — PROGRESS NOTES
[facility]      2022    Patient Name: Suzan Zavala  :  [age]      Chief Complaint:  Chief Complaint   Patient presents with    Follow-up     2 wk recheck- From urgent care         HPI  Patient presents today for follow-up on recent illness. On , patient developed fever, cough, fatigue and body aches. She was seen at urgent care on Monday, 22. She was diagnosed with influenza and pneumonia. She was given prescriptions for Tamiflu and doxycycline. She did not take the Tamiflu as it was out-of-stock at her pharmacy. After starting the doxycycline, she began having \"explosive\" diarrhea. Of note, she reports chronic intermittent diarrhea x 5-6 months, for which she is scheduled for a colonoscopy on 22. However, the diarrhea after taking doxycycline was much more severe. She called our office and was scheduled for a virtual visit on , and a prescription for Levaquin was sent in to replace the doxycycline. She completed the antibiotic. She still has a mild, nonproductive cough and postnasal drainage but overall reports significant improvement in her symptoms.        Past Medical History:   Diagnosis Date    Anxiety     Situational    Chronic insomnia 2013    Diverticulosis 2006    Dyslipidemia 2013    Eczema     Environmental and seasonal allergies     GERD (gastroesophageal reflux disease)     Hypothyroidism     Internal hemorrhoids 2006    Open-angle glaucoma 2019    Bilateral - Mild    Osteoarthritis of both knees     Osteopenia of both hips 2019    Pneumonia 2019    Right    Vitamin D deficiency 2013       Past Surgical History:   Procedure Laterality Date    BLADDER REPAIR  2013    mesh sling    CATARACT REMOVAL Bilateral 10/14/2019 & 10/28/19    COLONOSCOPY  2017    Hyperplastic Polyp    TONSILLECTOMY      TOTAL KNEE ARTHROPLASTY Left 2019    TOTAL KNEE ARTHROPLASTY Right 2020       Family History Problem Relation Age of Onset    Heart Failure Mother     High Cholesterol Mother     High Cholesterol Father     Dementia Father     Arrhythmia Brother         Atrial Fibrillation    Breast Cancer Neg Hx        Social History     Tobacco Use    Smoking status: Never    Smokeless tobacco: Never   Vaping Use    Vaping Use: Never used   Substance Use Topics    Alcohol use:  Yes     Alcohol/week: 1.0 - 2.0 standard drink     Types: 1 - 2 Glasses of wine per week    Drug use: Never       Current Outpatient Medications:     diphenoxylate-atropine (LOMOTIL) 2.5-0.025 MG per tablet, Take 1 tablet by mouth 4 times daily as needed for Diarrhea., Disp: , Rfl:     benzonatate (TESSALON) 200 MG capsule, Take 1 capsule by mouth 3 times daily as needed for Cough, Disp: 20 capsule, Rfl: 0    fluticasone (FLONASE) 50 MCG/ACT nasal spray, 2 sprays by Nasal route daily, Disp: 1 each, Rfl: 5    escitalopram (LEXAPRO) 20 MG tablet, Take 1 tablet by mouth daily, Disp: 90 tablet, Rfl: 3    Cyanocobalamin (VITAMIN B 12 PO), Take 1 tablet by mouth daily, Disp: , Rfl:     Cranberry 125 MG TABS, Take 450 mg by mouth daily, Disp: , Rfl:     Multiple Vitamins-Minerals (MULTIVITAMIN ADULTS PO), Take 1 tablet by mouth daily, Disp: , Rfl:     LACTOBACILLUS BIFIDUS PO, Take 1 capsule by mouth, Disp: , Rfl:     tretinoin (RETIN-A) 0.025 % cream, APPLY SMALL AMOUNT TOPICALLY TO DRY FACE EVERY NIGHT, Disp: , Rfl:     ZORVOLEX 35 MG CAPS, Take 1 capsule by mouth 3 times daily as needed (joint pain) Brand medically necessary!, Disp: 90 capsule, Rfl: 11    ascorbic acid (VITAMIN C) 1000 MG tablet, Take by mouth, Disp: , Rfl:     Biotin 2.5 MG CAPS, Take by mouth, Disp: , Rfl:     denosumab (PROLIA) 60 MG/ML SOSY SC injection, Inject 60 mg into the skin, Disp: , Rfl:     famotidine (PEPCID) 20 MG tablet, Take 20 mg by mouth 2 times daily, Disp: , Rfl:     fluticasone (CUTIVATE) 0.05 % cream, Apply topically 2 times daily, Disp: , Rfl:     latanoprost (XALATAN) 0.005 % ophthalmic solution, INSTILL 1 DROP IN BOTH EYES AT NIGHT, Disp: , Rfl:     levothyroxine (SYNTHROID) 50 MCG tablet, Take 50 mcg by mouth every morning (before breakfast), Disp: , Rfl:     melatonin 1 MG tablet, Take 2 mg by mouth, Disp: , Rfl:     rosuvastatin (CRESTOR) 10 MG tablet, Take 10 mg by mouth, Disp: , Rfl:     clonazePAM (KLONOPIN) 1 MG tablet, Take 1 tablet by mouth nightly for 30 days. , Disp: 30 tablet, Rfl: 5    Allergies   Allergen Reactions    Doxycycline Diarrhea    Penicillins Hives    Cefprozil Nausea And Vomiting    Erythromycin Rash       Review of Systems   Constitutional:  Negative for chills and fever. HENT:  Positive for postnasal drip and rhinorrhea. Negative for congestion, ear pain, sinus pressure, sinus pain and sore throat. Respiratory:  Positive for cough. Negative for shortness of breath and wheezing. Cardiovascular:  Negative for chest pain. Gastrointestinal:  Positive for diarrhea (intermittent; at baseline; scheduled for colonoscopy on 12/6/22). Negative for abdominal pain, nausea and vomiting. Genitourinary:  Negative for dysuria and hematuria. Neurological:  Negative for dizziness, light-headedness and headaches. Objective:  /78 (Site: Left Upper Arm, Position: Sitting, Cuff Size: Large Adult)   Pulse 92   Temp 97 °F (36.1 °C) (Temporal)   Resp 16   Ht 5' 3.5\" (1.613 m)   Wt 230 lb (104.3 kg)   SpO2 95%   BMI 40.10 kg/m²     Examination:  Physical Exam  Vitals and nursing note reviewed. Constitutional:       General: She is not in acute distress. Appearance: Normal appearance. HENT:      Right Ear: Tympanic membrane, ear canal and external ear normal.      Left Ear: Tympanic membrane, ear canal and external ear normal.      Nose: Mucosal edema present. Mouth/Throat:      Mouth: Mucous membranes are moist.      Pharynx: Oropharynx is clear. Posterior oropharyngeal erythema (mild) present. No oropharyngeal exudate. Cardiovascular:      Rate and Rhythm: Normal rate and regular rhythm. Pulmonary:      Effort: Pulmonary effort is normal. No respiratory distress. Breath sounds: Normal breath sounds. No wheezing or rales. Abdominal:      General: Bowel sounds are normal.      Palpations: Abdomen is soft. Tenderness: There is no abdominal tenderness. Skin:     General: Skin is warm and dry. Neurological:      Mental Status: She is alert and oriented to person, place, and time. Psychiatric:         Mood and Affect: Mood normal.         Assessment/Plan:    Hemanth Jenkins was seen today for follow-up. Diagnoses and all orders for this visit:    Pneumonia due to influenza A virus  -     benzonatate (TESSALON) 200 MG capsule; Take 1 capsule by mouth 3 times daily as needed for Cough  Symptoms significantly improved. Continue to monitor. Take Tessalon as needed for cough. Continue Flonase. Call if symptoms worsen or fail to improve. Follow-up and Dispositions    Return if symptoms worsen or fail to improve. On this date, 11/28/22, I have spent 25 minutes reviewing previous notes, test results and face to face with the patient discussing the diagnosis and importance of compliance with the treatment plan as well as documenting on the day of the visit. An electronic signature was used to authenticate this note.   LINDSEY Mon

## 2022-11-28 NOTE — TELEPHONE ENCOUNTER
From: Lauryn Ching  To: Nathalie Soulier  Sent: 11/28/2022 1:55 PM EST  Subject: Lomotil    I am almost out of the lomotil you prescribed for my diarrhea. It shows no refills allowed so I wonder if you could send in another prescription for it. I am having my colonoscopy on Dec 6th but need lomotil before then  Thank you.   Ravi Oviedo

## 2022-11-29 RX ORDER — DIPHENOXYLATE HYDROCHLORIDE AND ATROPINE SULFATE 2.5; .025 MG/1; MG/1
1 TABLET ORAL 4 TIMES DAILY PRN
Qty: 30 TABLET | Refills: 0 | Status: SHIPPED | OUTPATIENT
Start: 2022-11-29 | End: 2022-12-09

## 2022-12-29 RX ORDER — LEVOTHYROXINE SODIUM 0.05 MG/1
TABLET ORAL
Qty: 90 TABLET | OUTPATIENT
Start: 2022-12-29

## 2022-12-30 ENCOUNTER — TELEPHONE (OUTPATIENT)
Dept: INTERNAL MEDICINE CLINIC | Facility: CLINIC | Age: 77
End: 2022-12-30

## 2022-12-30 DIAGNOSIS — F51.04 CHRONIC INSOMNIA: ICD-10-CM

## 2022-12-30 RX ORDER — CLONAZEPAM 1 MG/1
TABLET ORAL
Qty: 30 TABLET | OUTPATIENT
Start: 2022-12-30

## 2022-12-30 NOTE — TELEPHONE ENCOUNTER
Pt is requesting a refill for clonazepam. Pt is requesting it to be sent to Joselo at [facility].

## 2023-01-03 RX ORDER — CLONAZEPAM 1 MG/1
1 TABLET ORAL NIGHTLY
Qty: 30 TABLET | Refills: 5 | Status: SHIPPED | OUTPATIENT
Start: 2023-01-03 | End: 2023-07-02

## 2023-01-03 NOTE — TELEPHONE ENCOUNTER
Controlled Substance Monitoring:    Acute and Chronic Pain Monitoring:   RX Monitoring 1/3/2023   Periodic Controlled Substance Monitoring No signs of potential drug abuse or diversion identified. Orders Placed This Encounter    clonazePAM (KLONOPIN) 1 MG tablet     Sig: Take 1 tablet by mouth nightly for 180 days. Max Daily Amount: 1 mg     Dispense:  30 tablet     Refill:  5         Chay BLUE Sanford MD

## 2023-02-20 RX ORDER — ROSUVASTATIN CALCIUM 10 MG/1
TABLET, COATED ORAL
Qty: 90 TABLET | OUTPATIENT
Start: 2023-02-20

## 2023-02-22 DIAGNOSIS — E78.5 DYSLIPIDEMIA: ICD-10-CM

## 2023-02-22 DIAGNOSIS — R73.01 IFG (IMPAIRED FASTING GLUCOSE): ICD-10-CM

## 2023-02-22 DIAGNOSIS — N18.31 STAGE 3A CHRONIC KIDNEY DISEASE (HCC): ICD-10-CM

## 2023-02-22 DIAGNOSIS — E03.9 HYPOTHYROIDISM, UNSPECIFIED TYPE: ICD-10-CM

## 2023-02-22 LAB
ALBUMIN SERPL-MCNC: 3.3 G/DL (ref 3.2–4.6)
ALBUMIN/GLOB SERPL: 1.1 (ref 0.4–1.6)
ALP SERPL-CCNC: 55 U/L (ref 50–136)
ALT SERPL-CCNC: 26 U/L (ref 12–65)
ANION GAP SERPL CALC-SCNC: 4 MMOL/L (ref 2–11)
AST SERPL-CCNC: 21 U/L (ref 15–37)
BASOPHILS # BLD: 0 K/UL (ref 0–0.2)
BASOPHILS NFR BLD: 1 % (ref 0–2)
BILIRUB SERPL-MCNC: 0.5 MG/DL (ref 0.2–1.1)
BUN SERPL-MCNC: 19 MG/DL (ref 8–23)
CALCIUM SERPL-MCNC: 8.7 MG/DL (ref 8.3–10.4)
CHLORIDE SERPL-SCNC: 111 MMOL/L (ref 101–110)
CHOLEST SERPL-MCNC: 166 MG/DL
CO2 SERPL-SCNC: 27 MMOL/L (ref 21–32)
CREAT SERPL-MCNC: 1 MG/DL (ref 0.6–1)
DIFFERENTIAL METHOD BLD: NORMAL
EOSINOPHIL # BLD: 0.2 K/UL (ref 0–0.8)
EOSINOPHIL NFR BLD: 3 % (ref 0.5–7.8)
ERYTHROCYTE [DISTWIDTH] IN BLOOD BY AUTOMATED COUNT: 12.7 % (ref 11.9–14.6)
EST. AVERAGE GLUCOSE BLD GHB EST-MCNC: 120 MG/DL
GLOBULIN SER CALC-MCNC: 3 G/DL (ref 2.8–4.5)
GLUCOSE SERPL-MCNC: 103 MG/DL (ref 65–100)
HBA1C MFR BLD: 5.8 % (ref 4.8–5.6)
HCT VFR BLD AUTO: 46.2 % (ref 35.8–46.3)
HDLC SERPL-MCNC: 52 MG/DL (ref 40–60)
HDLC SERPL: 3.2
HGB BLD-MCNC: 14.7 G/DL (ref 11.7–15.4)
IMM GRANULOCYTES # BLD AUTO: 0 K/UL (ref 0–0.5)
IMM GRANULOCYTES NFR BLD AUTO: 0 % (ref 0–5)
LDLC SERPL CALC-MCNC: 84.2 MG/DL
LYMPHOCYTES # BLD: 1.5 K/UL (ref 0.5–4.6)
LYMPHOCYTES NFR BLD: 32 % (ref 13–44)
MCH RBC QN AUTO: 30.7 PG (ref 26.1–32.9)
MCHC RBC AUTO-ENTMCNC: 31.8 G/DL (ref 31.4–35)
MCV RBC AUTO: 96.5 FL (ref 82–102)
MONOCYTES # BLD: 0.5 K/UL (ref 0.1–1.3)
MONOCYTES NFR BLD: 10 % (ref 4–12)
NEUTS SEG # BLD: 2.6 K/UL (ref 1.7–8.2)
NEUTS SEG NFR BLD: 54 % (ref 43–78)
NRBC # BLD: 0 K/UL (ref 0–0.2)
PLATELET # BLD AUTO: 221 K/UL (ref 150–450)
PMV BLD AUTO: 10.1 FL (ref 9.4–12.3)
POTASSIUM SERPL-SCNC: 4.4 MMOL/L (ref 3.5–5.1)
PROT SERPL-MCNC: 6.3 G/DL (ref 6.3–8.2)
RBC # BLD AUTO: 4.79 M/UL (ref 4.05–5.2)
SODIUM SERPL-SCNC: 142 MMOL/L (ref 133–143)
TRIGL SERPL-MCNC: 149 MG/DL (ref 35–150)
TSH, 3RD GENERATION: 1.9 UIU/ML (ref 0.36–3.74)
VLDLC SERPL CALC-MCNC: 29.8 MG/DL (ref 6–23)
WBC # BLD AUTO: 4.8 K/UL (ref 4.3–11.1)

## 2023-02-28 ENCOUNTER — OFFICE VISIT (OUTPATIENT)
Dept: INTERNAL MEDICINE CLINIC | Facility: CLINIC | Age: 78
End: 2023-02-28
Payer: MEDICARE

## 2023-02-28 VITALS
BODY MASS INDEX: 39.27 KG/M2 | TEMPERATURE: 98.1 F | OXYGEN SATURATION: 95 % | WEIGHT: 230 LBS | SYSTOLIC BLOOD PRESSURE: 118 MMHG | HEIGHT: 64 IN | HEART RATE: 81 BPM | DIASTOLIC BLOOD PRESSURE: 80 MMHG

## 2023-02-28 DIAGNOSIS — M15.9 PRIMARY OSTEOARTHRITIS INVOLVING MULTIPLE JOINTS: ICD-10-CM

## 2023-02-28 DIAGNOSIS — E03.9 ACQUIRED HYPOTHYROIDISM: ICD-10-CM

## 2023-02-28 DIAGNOSIS — F43.21 COMPLICATED GRIEF: ICD-10-CM

## 2023-02-28 DIAGNOSIS — F51.04 CHRONIC INSOMNIA: ICD-10-CM

## 2023-02-28 DIAGNOSIS — E78.5 DYSLIPIDEMIA: Primary | ICD-10-CM

## 2023-02-28 DIAGNOSIS — N18.31 STAGE 3A CHRONIC KIDNEY DISEASE (HCC): ICD-10-CM

## 2023-02-28 DIAGNOSIS — F41.1 GENERALIZED ANXIETY DISORDER: ICD-10-CM

## 2023-02-28 DIAGNOSIS — R73.01 IFG (IMPAIRED FASTING GLUCOSE): ICD-10-CM

## 2023-02-28 DIAGNOSIS — K21.9 GASTROESOPHAGEAL REFLUX DISEASE, UNSPECIFIED WHETHER ESOPHAGITIS PRESENT: ICD-10-CM

## 2023-02-28 DIAGNOSIS — E66.01 OBESITY, CLASS III, BMI 40-49.9 (MORBID OBESITY) (HCC): ICD-10-CM

## 2023-02-28 PROCEDURE — 3017F COLORECTAL CA SCREEN DOC REV: CPT | Performed by: PHYSICIAN ASSISTANT

## 2023-02-28 PROCEDURE — 1123F ACP DISCUSS/DSCN MKR DOCD: CPT | Performed by: PHYSICIAN ASSISTANT

## 2023-02-28 PROCEDURE — G8399 PT W/DXA RESULTS DOCUMENT: HCPCS | Performed by: PHYSICIAN ASSISTANT

## 2023-02-28 PROCEDURE — G8417 CALC BMI ABV UP PARAM F/U: HCPCS | Performed by: PHYSICIAN ASSISTANT

## 2023-02-28 PROCEDURE — 1036F TOBACCO NON-USER: CPT | Performed by: PHYSICIAN ASSISTANT

## 2023-02-28 PROCEDURE — 1090F PRES/ABSN URINE INCON ASSESS: CPT | Performed by: PHYSICIAN ASSISTANT

## 2023-02-28 PROCEDURE — 99215 OFFICE O/P EST HI 40 MIN: CPT | Performed by: PHYSICIAN ASSISTANT

## 2023-02-28 PROCEDURE — G8484 FLU IMMUNIZE NO ADMIN: HCPCS | Performed by: PHYSICIAN ASSISTANT

## 2023-02-28 PROCEDURE — G8427 DOCREV CUR MEDS BY ELIG CLIN: HCPCS | Performed by: PHYSICIAN ASSISTANT

## 2023-02-28 RX ORDER — ROSUVASTATIN CALCIUM 10 MG/1
10 TABLET, COATED ORAL NIGHTLY
Qty: 90 TABLET | Refills: 3 | Status: SHIPPED | OUTPATIENT
Start: 2023-02-28

## 2023-02-28 RX ORDER — FAMOTIDINE 20 MG/1
20 TABLET, FILM COATED ORAL 2 TIMES DAILY
Qty: 180 TABLET | Refills: 3 | Status: SHIPPED | OUTPATIENT
Start: 2023-02-28

## 2023-02-28 SDOH — ECONOMIC STABILITY: FOOD INSECURITY: WITHIN THE PAST 12 MONTHS, YOU WORRIED THAT YOUR FOOD WOULD RUN OUT BEFORE YOU GOT MONEY TO BUY MORE.: NEVER TRUE

## 2023-02-28 SDOH — ECONOMIC STABILITY: FOOD INSECURITY: WITHIN THE PAST 12 MONTHS, THE FOOD YOU BOUGHT JUST DIDN'T LAST AND YOU DIDN'T HAVE MONEY TO GET MORE.: NEVER TRUE

## 2023-02-28 SDOH — ECONOMIC STABILITY: HOUSING INSECURITY
IN THE LAST 12 MONTHS, WAS THERE A TIME WHEN YOU DID NOT HAVE A STEADY PLACE TO SLEEP OR SLEPT IN A SHELTER (INCLUDING NOW)?: NO

## 2023-02-28 SDOH — ECONOMIC STABILITY: INCOME INSECURITY: HOW HARD IS IT FOR YOU TO PAY FOR THE VERY BASICS LIKE FOOD, HOUSING, MEDICAL CARE, AND HEATING?: NOT HARD AT ALL

## 2023-02-28 ASSESSMENT — ENCOUNTER SYMPTOMS
SHORTNESS OF BREATH: 0
ABDOMINAL DISTENTION: 0
BLOOD IN STOOL: 0
NAUSEA: 0
VOMITING: 0
ABDOMINAL PAIN: 0
CONSTIPATION: 1
DIARRHEA: 1

## 2023-02-28 NOTE — PATIENT INSTRUCTIONS
Trial increase of Clonazepam to 1.5 mg (1.5 x 1 mg tablets) nightly - if helpful in getting to sleep quicker/sleeping better then notify me so I can alter the dose/prescription  Increase Famotidine to twice daily consistently - ideally timed 10-12 hours apart for better control of reflux  Suggest increasing frequency of Zorvolex to not exceed 1 tablet/day  Remain well hydrated with plenty of water  Encouraged to reduce intake of sweets/desserts  Continue chronic medications as prescribed  Recommend remaining on L-Methylfolate for at least 1 year  Reassurance given that kidney function has not worsened or improved with reduced use of Zorvolex - it is stable and just 1 point out of normal range

## 2023-02-28 NOTE — PROGRESS NOTES
Monie Eaton (:  1945) is a 78 y.o. female,Established patient, here for evaluation of the following chief complaint(s):  Follow-up (Elevated Glucose, Hypothyroidism, Hyperlipidemia)         ASSESSMENT/PLAN:  1. Dyslipidemia  -     rosuvastatin (CRESTOR) 10 MG tablet; Take 1 tablet by mouth nightly, Disp-90 tablet, R-3Normal  -     Lipid Panel; Future  2. Acquired hypothyroidism  -     TSH; Future  3. Obesity, Class III, BMI 40-49.9 (morbid obesity) ([facility])  4. Stage 3a chronic kidney disease ([facility] [address]  -     Comprehensive Metabolic Panel; Future  5. IFG (impaired fasting glucose)  -     Comprehensive Metabolic Panel; Future  -     Hemoglobin A1C; Future  6. Complicated grief  7. Generalized anxiety disorder  8. Chronic insomnia  9. Gastroesophageal reflux disease, unspecified whether esophagitis present  10. Primary osteoarthritis involving multiple joints      Patient Instructions   Trial increase of Clonazepam to 1.5 mg (1.5 x 1 mg tablets) nightly - if helpful in getting to sleep quicker/sleeping better then notify me so I can alter the dose/prescription  Increase Famotidine to twice daily consistently - ideally timed 10-12 hours apart for better control of reflux  Suggest increasing frequency of Zorvolex to not exceed 1 tablet/day  Remain well hydrated with plenty of water  Encouraged to reduce intake of sweets/desserts  Continue chronic medications as prescribed  Recommend remaining on L-Methylfolate for at least 1 year  Reassurance given that kidney function has not worsened or improved with reduced use of Zorvolex - it is stable and just 1 point out of normal range      Return in about 4 months (around 2023) for MWE + routine f/u. Subjective   SUBJECTIVE/OBJECTIVE:  HPI  The patient is a 78 y.o. female who is seen for evaluation of hyperlipidemia. She was tested because of dyslipidemia.   The current state of this condition is no significant medication side effects noted, reasonably well controlled, and well controlled on Crestor 10 mg q hs - taking as prescribed.       Last Lipid Panel:   Lab Results   Component Value Date    CHOL 166 02/22/2023    CHOL 194 10/17/2022    CHOL 163 06/14/2022     Lab Results   Component Value Date    TRIG 149 02/22/2023    TRIG 75 10/17/2022    TRIG 151 (H) 06/14/2022     Lab Results   Component Value Date    HDL 52 02/22/2023    HDL 62 (H) 10/17/2022    HDL 51 06/14/2022     Lab Results   Component Value Date    LDLCALC 84.2 02/22/2023    LDLCALC 117 (H) 10/17/2022    LDLCALC 81.8 06/14/2022     Lab Results   Component Value Date    LABVLDL 29.8 (H) 02/22/2023    LABVLDL 15 10/17/2022    LABVLDL 30.2 (H) 06/14/2022    VLDL 23 02/14/2022    VLDL 24 11/16/2021    VLDL 21 05/10/2021     Lab Results   Component Value Date    CHOLHDLRATIO 3.2 02/22/2023    CHOLHDLRATIO 3.1 10/17/2022    CHOLHDLRATIO 3.2 06/14/2022       Patient is here for follow-up of elevated fasting glucose.  The current state of this condition is asymptomatic and poorly controlled - not currently on medications for this problem.  She describes eating a lot of take out during boyfriend's illness the past few months that ultimately resulted in his passing at the end of January.  Since then, she has been eating whatever/whenever with lots of desserts and breads that friends/family have brought to her for comfort.    Hemoglobin A1C   Date Value Ref Range Status   02/22/2023 5.8 (H) 4.8 - 5.6 % Final       The patient is a 77 y.o. female who is seen for follow up of hypothyroidism. Current symptoms:  constipation . Symptoms are basically asymptomatic. The patient is following treatment regimen with good compliance.  Current treatment regimen consists of Synthroid 50 mcg daily and is taking it first thing in the AM on an empty stomach with no other food/liquids/other medications for at least 45-60 minutes.    Lab Results   Component Value Date    WYE1FCK 1.900 02/22/2023    KLG8JZJ 1.440 10/17/2022     RCQ8MXA 1.930 06/14/2022    TSH 3.650 02/14/2022    TSH 2.350 11/16/2021    TSH 6.360 (H) 08/11/2021     Lab Results   Component Value Date    T4FREE 1.15 10/17/2019    T4FREE 1.20 08/27/2019    T4FREE 1.08 07/15/2019     No results found for: TGAB      Patient is here for follow-up of chronic kidney disease. The current state of this condition is asymptomatic and stable - not currently on medications for this problem. She has made a conscious effort to avoid use of Zorvolex on a daily basis due to fear of kidney damage - currently only taking it 1-2 times/week - single pill/dose but states she is having a lot more discomfort because of avoiding it's use. Lab Results   Component Value Date    CREATININE 1.00 02/22/2023    BUN 19 02/22/2023     02/22/2023    K 4.4 02/22/2023     (H) 02/22/2023    CO2 27 02/22/2023       Patient is here for follow-up of anxiety with depression. The current state of this condition is no significant medication side effects noted and poorly controlled due to current grief on Lexapro 20 mg daily + Ativan 1 mg prn (not using currently) + L-Methylfolate 15 mg daily - taking as prescribed. She denies need for Ativan refills at this time and did not bring prescription bottle with her today. Patient is here for follow-up of chronic insomnia. The current state of this condition is no significant medication side effects noted and poorly controlled on Clonazepam 1 mg nightly - taking as prescribed. She describes difficulty falling asleep due to mind racing/grieving and does wake up sometimes due to discomfort. Current prescription was filled 2/2/23 for #30 with 4 refills before 7/4/23. Review of Systems   Constitutional:  Negative for fatigue and unexpected weight change. Eyes:  Negative for visual disturbance. Respiratory:  Negative for shortness of breath. Cardiovascular:  Negative for chest pain and palpitations.    Gastrointestinal:  Positive for constipation and diarrhea. Negative for abdominal distention, abdominal pain, blood in stool, nausea and vomiting. Positive for heartburn - several times/week   Endocrine: Negative for cold intolerance, heat intolerance, polydipsia and polyuria. Negative for hair loss. Musculoskeletal:  Positive for myalgias (Bilateral leg cramps. ). Neurological:  Negative for numbness. Psychiatric/Behavioral:  Positive for dysphoric mood (situational grief) and sleep disturbance (difficulty falling asleep). The patient is nervous/anxious (situational grief). /80 (Site: Left Upper Arm, Position: Sitting, Cuff Size: Large Adult)   Pulse 81   Temp 98.1 °F (36.7 °C) (Temporal)   Ht 5' 3.5\" (1.613 m)   Wt 230 lb (104.3 kg)   SpO2 95%   BMI 40.10 kg/m²       Objective   Physical Exam  Constitutional:       Appearance: Normal appearance. HENT:      Head: Normocephalic and atraumatic. Eyes:      Conjunctiva/sclera: Conjunctivae normal.      Pupils: Pupils are equal, round, and reactive to light. Neck:      Vascular: No carotid bruit. Cardiovascular:      Rate and Rhythm: Normal rate and regular rhythm. Heart sounds: Normal heart sounds. Pulmonary:      Effort: Pulmonary effort is normal.      Breath sounds: Normal breath sounds. Musculoskeletal:         General: Normal range of motion. Cervical back: Normal range of motion. Right lower leg: Edema (trace) present. Left lower leg: Edema (trace) present. Skin:     General: Skin is warm and dry. Neurological:      Mental Status: She is alert and oriented to person, place, and time. Psychiatric:         Mood and Affect: Mood normal.         Behavior: Behavior normal.         Thought Content:  Thought content normal.         Judgment: Judgment normal.          On this date 2/28/2023 I have spent 50 minutes reviewing previous notes, test results and face to face with the patient discussing the diagnosis and importance of compliance with the treatment plan as well as documenting on the day of the visit. An electronic signature was used to authenticate this note.     --Lupis Longoria PA-C

## 2023-03-06 ENCOUNTER — OFFICE VISIT (OUTPATIENT)
Dept: ORTHOPEDIC SURGERY | Age: 78
End: 2023-03-06
Payer: MEDICARE

## 2023-03-06 DIAGNOSIS — M20.41 HAMMER TOE OF RIGHT FOOT: ICD-10-CM

## 2023-03-06 DIAGNOSIS — M20.11 HALLUX VALGUS OF RIGHT FOOT: ICD-10-CM

## 2023-03-06 DIAGNOSIS — M79.671 RIGHT FOOT PAIN: Primary | ICD-10-CM

## 2023-03-06 PROCEDURE — G8427 DOCREV CUR MEDS BY ELIG CLIN: HCPCS | Performed by: ORTHOPAEDIC SURGERY

## 2023-03-06 PROCEDURE — 1036F TOBACCO NON-USER: CPT | Performed by: ORTHOPAEDIC SURGERY

## 2023-03-06 PROCEDURE — G8484 FLU IMMUNIZE NO ADMIN: HCPCS | Performed by: ORTHOPAEDIC SURGERY

## 2023-03-06 PROCEDURE — 3017F COLORECTAL CA SCREEN DOC REV: CPT | Performed by: ORTHOPAEDIC SURGERY

## 2023-03-06 PROCEDURE — 1090F PRES/ABSN URINE INCON ASSESS: CPT | Performed by: ORTHOPAEDIC SURGERY

## 2023-03-06 PROCEDURE — G8399 PT W/DXA RESULTS DOCUMENT: HCPCS | Performed by: ORTHOPAEDIC SURGERY

## 2023-03-06 PROCEDURE — 1123F ACP DISCUSS/DSCN MKR DOCD: CPT | Performed by: ORTHOPAEDIC SURGERY

## 2023-03-06 PROCEDURE — 99214 OFFICE O/P EST MOD 30 MIN: CPT | Performed by: ORTHOPAEDIC SURGERY

## 2023-03-06 PROCEDURE — G8417 CALC BMI ABV UP PARAM F/U: HCPCS | Performed by: ORTHOPAEDIC SURGERY

## 2023-03-06 NOTE — PROGRESS NOTES
Name: Osmar Mccormack  YOB: 1945  Gender: female  MRN: [id number]    Summary:   Right hallux valgus and second and third claw toes with MTP subluxation       CC: Foot Pain (Right foot pain will xray today )       HPI: Osmar Mccormack is a 78 y.o. female who presents with Foot Pain (Right foot pain will xray today )  . Patient presents the office today with a 30+ year history of worsening right foot pain. She is tried pads and spacers to the years. The pain is affecting actives of daily living now. History was obtained by Patient     ROS/Meds/PSH/PMH/FH/SH: I personally reviewed the patients standard intake form. Below are the pertinents    Tobacco:  reports that she has never smoked. She has never used smokeless tobacco.  Diabetes: None      Physical Examination:    Exam of the right foot shows strongly palpable pulses and intact sensation. She has a hallux valgus deformity and second and third fixed claw toe deformities with volar plate pain and metatarsal head pain. Imaging:   I independently interpreted XR taken today  Right foot XR: AP, Lateral, Oblique views     ICD-10-CM    1. Right foot pain  M79.671 XR FOOT RIGHT (MIN 3 VIEWS)      2. Hallux valgus of right foot  M20.11       3.  Hammer toe of right foot  M20.41          Report: AP, lateral, oblique x-ray of the right foot demonstrates Duy valgus and second third MTP subluxation    Impression: Hallux valgus and second and third MTP subluxation   Emma Turner III, MD           Assessment:   Right hallux valgus and second and third hammertoes with MTP subluxation    Treatment Plan:   4 This is a chronic illness/condition with exacerbation and progression  Treatment at this time: Elective major surgery with procedural risk factors  Studies ordered: NO XR needed @ Next Visit    Weight-bearing status: WBAT        Return to work/work restrictions: none  No medications given      Right double level opening bunionectomy, right calcaneal autograft harvest, right second and third PIP resection arthroplasties and open Weil osteotomies    Outpatient - 1-1/4 hours, c-arm, sagittal saw, K-wires , Bradley plates & screws ,    Anesthesia -  Choice       The patient understands the diagnosis of bunions and claw toes, conservative and surgical treatment. R/C outlined including the risk of recurrence, risk of non-healing of skin and bone with/without infection,  potential loss/amputation of a toe(s) secondary to circulation loss, the risk of stiffness in the toes, and the overall risk of swelling that could be prolonged. The patient understands the use of internal and/or external k-wire type fixation and that it may take 6 months to a year before the patient can comfortably get back into regular/dress shoes. Generalized surgical risks and complications were discussed. The patient accepts and would like to proceed with surgery.

## 2023-03-07 DIAGNOSIS — M20.41 HAMMER TOE OF RIGHT FOOT: Primary | ICD-10-CM

## 2023-03-07 DIAGNOSIS — M20.11 HALLUX VALGUS OF RIGHT FOOT: ICD-10-CM

## 2023-03-14 ENCOUNTER — OFFICE VISIT (OUTPATIENT)
Dept: INTERNAL MEDICINE CLINIC | Facility: CLINIC | Age: 78
End: 2023-03-14
Payer: MEDICARE

## 2023-03-14 VITALS
OXYGEN SATURATION: 98 % | BODY MASS INDEX: 39.09 KG/M2 | DIASTOLIC BLOOD PRESSURE: 70 MMHG | HEIGHT: 64 IN | WEIGHT: 229 LBS | SYSTOLIC BLOOD PRESSURE: 100 MMHG | HEART RATE: 71 BPM | TEMPERATURE: 97.3 F

## 2023-03-14 DIAGNOSIS — F43.21 GRIEF REACTION: ICD-10-CM

## 2023-03-14 DIAGNOSIS — R82.90 ABNORMAL FINDING ON URINALYSIS: ICD-10-CM

## 2023-03-14 DIAGNOSIS — R82.90 CLOUDY URINE: ICD-10-CM

## 2023-03-14 DIAGNOSIS — K59.1 FUNCTIONAL DIARRHEA: ICD-10-CM

## 2023-03-14 DIAGNOSIS — R30.0 BURNING WITH URINATION: Primary | ICD-10-CM

## 2023-03-14 DIAGNOSIS — R30.0 BURNING WITH URINATION: ICD-10-CM

## 2023-03-14 LAB
BILIRUBIN, URINE, POC: NEGATIVE
BLOOD URINE, POC: ABNORMAL
GLUCOSE URINE, POC: NEGATIVE
KETONES, URINE, POC: NEGATIVE
LEUKOCYTE ESTERASE, URINE, POC: ABNORMAL
NITRITE, URINE, POC: NEGATIVE
PH, URINE, POC: 5.5 (ref 4.6–8)
PROTEIN,URINE, POC: NEGATIVE
SPECIFIC GRAVITY, URINE, POC: 1.02 (ref 1–1.03)
URINALYSIS CLARITY, POC: ABNORMAL
URINALYSIS COLOR, POC: YELLOW
UROBILINOGEN, POC: NORMAL

## 2023-03-14 PROCEDURE — 81003 URINALYSIS AUTO W/O SCOPE: CPT | Performed by: PHYSICIAN ASSISTANT

## 2023-03-14 PROCEDURE — G8399 PT W/DXA RESULTS DOCUMENT: HCPCS | Performed by: PHYSICIAN ASSISTANT

## 2023-03-14 PROCEDURE — G8427 DOCREV CUR MEDS BY ELIG CLIN: HCPCS | Performed by: PHYSICIAN ASSISTANT

## 2023-03-14 PROCEDURE — 1123F ACP DISCUSS/DSCN MKR DOCD: CPT | Performed by: PHYSICIAN ASSISTANT

## 2023-03-14 PROCEDURE — G8484 FLU IMMUNIZE NO ADMIN: HCPCS | Performed by: PHYSICIAN ASSISTANT

## 2023-03-14 PROCEDURE — 3017F COLORECTAL CA SCREEN DOC REV: CPT | Performed by: PHYSICIAN ASSISTANT

## 2023-03-14 PROCEDURE — 1090F PRES/ABSN URINE INCON ASSESS: CPT | Performed by: PHYSICIAN ASSISTANT

## 2023-03-14 PROCEDURE — 99213 OFFICE O/P EST LOW 20 MIN: CPT | Performed by: PHYSICIAN ASSISTANT

## 2023-03-14 PROCEDURE — G8417 CALC BMI ABV UP PARAM F/U: HCPCS | Performed by: PHYSICIAN ASSISTANT

## 2023-03-14 PROCEDURE — 1036F TOBACCO NON-USER: CPT | Performed by: PHYSICIAN ASSISTANT

## 2023-03-14 RX ORDER — CIPROFLOXACIN 250 MG/1
250 TABLET, FILM COATED ORAL 2 TIMES DAILY
Qty: 6 TABLET | Refills: 0 | Status: SHIPPED | OUTPATIENT
Start: 2023-03-14 | End: 2023-03-15 | Stop reason: ALTCHOICE

## 2023-03-14 SDOH — ECONOMIC STABILITY: FOOD INSECURITY: WITHIN THE PAST 12 MONTHS, YOU WORRIED THAT YOUR FOOD WOULD RUN OUT BEFORE YOU GOT MONEY TO BUY MORE.: PATIENT DECLINED

## 2023-03-14 SDOH — ECONOMIC STABILITY: INCOME INSECURITY: HOW HARD IS IT FOR YOU TO PAY FOR THE VERY BASICS LIKE FOOD, HOUSING, MEDICAL CARE, AND HEATING?: PATIENT DECLINED

## 2023-03-14 SDOH — ECONOMIC STABILITY: HOUSING INSECURITY
IN THE LAST 12 MONTHS, WAS THERE A TIME WHEN YOU DID NOT HAVE A STEADY PLACE TO SLEEP OR SLEPT IN A SHELTER (INCLUDING NOW)?: PATIENT REFUSED

## 2023-03-14 SDOH — ECONOMIC STABILITY: FOOD INSECURITY: WITHIN THE PAST 12 MONTHS, THE FOOD YOU BOUGHT JUST DIDN'T LAST AND YOU DIDN'T HAVE MONEY TO GET MORE.: PATIENT DECLINED

## 2023-03-14 ASSESSMENT — ENCOUNTER SYMPTOMS
NAUSEA: 0
DIARRHEA: 1
VOMITING: 0

## 2023-03-14 ASSESSMENT — PATIENT HEALTH QUESTIONNAIRE - PHQ9
1. LITTLE INTEREST OR PLEASURE IN DOING THINGS: 1
SUM OF ALL RESPONSES TO PHQ9 QUESTIONS 1 & 2: 1
SUM OF ALL RESPONSES TO PHQ QUESTIONS 1-9: 1
2. FEELING DOWN, DEPRESSED OR HOPELESS: 0

## 2023-03-14 NOTE — PROGRESS NOTES
Shaggy Fraire (:  ) is a 78 y.o. female,Established patient, here for evaluation of the following chief complaint(s):  Dysuria         ASSESSMENT/PLAN:  1. Burning with urination  -     AMB POC URINALYSIS DIP STICK AUTO W/O MICRO  -     Culture, Urine; Future  -     ciprofloxacin (CIPRO) 250 MG tablet; Take 1 tablet by mouth 2 times daily for 3 days, Disp-6 tablet, R-0Normal  2. Cloudy urine  -     AMB POC URINALYSIS DIP STICK AUTO W/O MICRO  -     Culture, Urine; Future  3. Abnormal finding on urinalysis  -     Culture, Urine; Future  -     ciprofloxacin (CIPRO) 250 MG tablet; Take 1 tablet by mouth 2 times daily for 3 days, Disp-6 tablet, R-0Normal  4. Functional diarrhea  5. Grief reaction      Patient Instructions   Start Cipro 250 mg twice daily x 3 days  Emphasized the importance of hydrating well with plenty of water since this is likely the most direct factor contributing to her symptoms right now  Cautioned to utilize Ativan for panic/anxiety/overwhelmed feelings rather than just grief/sadness alone  Consider trial addition of Restora probiotic to see if that might help improve her gut symptoms  Continue chronic medications as prescribed  Move Lexapro + L-Methylfolate to AM dosing so she'll get peak effects during the day rather than at night      Return if symptoms worsen or fail to improve. Subjective   SUBJECTIVE/OBJECTIVE:  Dysuria   This is a new problem. The current episode started today. The problem occurs every urination. Quality: pressure. The pain is at a severity of 4/10. There has been no fever. She is Not sexually active. There is No history of pyelonephritis. Associated symptoms include urgency. Pertinent negatives include no chills, discharge, flank pain, frequency, hematuria, hesitancy, nausea, sweats or vomiting. She has tried nothing for the symptoms. There is no history of catheterization, kidney stones or recurrent UTIs.    She recalls not drinking as much water as she normally would [place] and staying very busy with activities.  She admits to feeling very thirsty yesterday.      Review of Systems   Constitutional:  Negative for chills.   Gastrointestinal:  Positive for diarrhea (negative GI work-up recently, believed to be related to stress since she is grieving). Negative for nausea and vomiting.   Genitourinary:  Positive for dysuria and urgency. Negative for flank pain, frequency, hematuria and hesitancy.        /70 (Site: Right Upper Arm, Position: Sitting, Cuff Size: Large Adult)   Pulse 71   Temp 97.3 °F (36.3 °C) (Temporal)   Ht 5' 3.5\" (1.613 m)   Wt 229 lb (103.9 kg)   SpO2 98%   BMI 39.93 kg/m²       Objective   Physical Exam  Constitutional:       Appearance: Normal appearance.   HENT:      Head: Normocephalic and atraumatic.   Eyes:      Conjunctiva/sclera: Conjunctivae normal.      Pupils: Pupils are equal, round, and reactive to light.   Neck:      Vascular: No carotid bruit.   Cardiovascular:      Rate and Rhythm: Normal rate and regular rhythm.      Heart sounds: Normal heart sounds.   Pulmonary:      Effort: Pulmonary effort is normal.      Breath sounds: Normal breath sounds.   Abdominal:      General: Bowel sounds are normal.      Palpations: Abdomen is soft.      Tenderness: There is no abdominal tenderness.   Musculoskeletal:         General: Normal range of motion.      Cervical back: Normal range of motion.   Skin:     General: Skin is warm and dry.   Neurological:      Mental Status: She is alert and oriented to person, place, and time.   Psychiatric:         Mood and Affect: Mood normal.         Behavior: Behavior normal.         Thought Content: Thought content normal.         Judgment: Judgment normal.          Results for orders placed or performed in visit on 03/14/23   AMB POC URINALYSIS DIP STICK AUTO W/O MICRO   Result Value Ref Range    Color, Urine, POC Yellow     Clarity, Urine,  POC Slightly Cloudy     Glucose, Urine, POC Negative Negative    Bilirubin, Urine, POC Negative Negative    Ketones, Urine, POC Negative Negative    Specific Gravity, Urine, POC 1.020 1.001 - 1.035    Blood, Urine, POC Trace-lysed Negative    pH, Urine, POC 5.5 4.6 - 8.0    Protein, Urine, POC Negative Negative    Urobilinogen, POC Normal     Nitrite, Urine, POC Negative Negative    Leukocyte Esterase, Urine, POC 1+ Negative           An electronic signature was used to authenticate this note.     --Ami Waite, PAYvroseC

## 2023-03-14 NOTE — PATIENT INSTRUCTIONS
Start Cipro 250 mg twice daily x 3 days  Emphasized the importance of hydrating well with plenty of water since this is likely the most direct factor contributing to her symptoms right now  Cautioned to utilize Ativan for panic/anxiety/overwhelmed feelings rather than just grief/sadness alone  Consider trial addition of Restora probiotic to see if that might help improve her gut symptoms  Continue chronic medications as prescribed  Move Lexapro + L-Methylfolate to AM dosing so she'll get peak effects during the day rather than at night

## 2023-03-15 NOTE — PERIOP NOTE
Patient verified name and . Order for consent not found in EHR; patient verifies procedure. Type 1B surgery, phone assessment complete. Orders not received. Labs per surgeon: none  Labs per anesthesia protocol: none    Patient answered medical/surgical history questions at their best of ability. All prior to admission medications documented in EPIC. Patient instructed to take the following medications the day of surgery according to anesthesia guidelines with a small sip of water: Escitalopram oxalate (LEXAPRO), Famotidine (Pepcid), Flonase if needed, Synthroid. On the day before surgery please take Acetaminophen 1000mg in the morning and then again before bed. You may substitute for Tylenol 650 mg. Hold all vitamins 7 days prior to surgery and NSAIDS 5 days prior to surgery. Prescription meds to hold:Zorvolex x 5 days  Patient instructed on the following:    > Arrive at [address] [place] [address], time of arrival to be called the day before by 1700  > NPO after midnight, unless otherwise indicated, including gum, mints, and ice chips  > Responsible adult must drive patient to the hospital, stay during surgery, and patient will need supervision 24 hours after anesthesia  > Use antibacterial soap in shower the night before surgery and on the morning of surgery  > All piercings must be removed prior to arrival.    > Leave all valuables (money and jewelry) at home but bring insurance card and ID on DOS.   > You may be required to pay a deductible or co-pay on the day of your procedure. You can pre-pay by calling [phone] if your surgery is at the [facility] or [phone] if your surgery is at the [facility]. > Do not wear make-up, nail polish, lotions, cologne, perfumes, powders, or oil on skin. Artificial nails are not permitted.

## 2023-03-16 LAB
BACTERIA SPEC CULT: ABNORMAL
SERVICE CMNT-IMP: ABNORMAL

## 2023-03-17 ENCOUNTER — TELEPHONE (OUTPATIENT)
Dept: INTERNAL MEDICINE CLINIC | Facility: CLINIC | Age: 78
End: 2023-03-17

## 2023-03-17 DIAGNOSIS — F43.21 COMPLICATED GRIEF: Primary | ICD-10-CM

## 2023-03-17 DIAGNOSIS — F51.04 CHRONIC INSOMNIA: ICD-10-CM

## 2023-03-17 NOTE — TELEPHONE ENCOUNTER
Pt states that she has been cutting her Clonazepam in half per your instructions and she states that it has been working well for her and she is requesting refills, please.

## 2023-03-22 ENCOUNTER — ANESTHESIA EVENT (OUTPATIENT)
Dept: SURGERY | Age: 78
End: 2023-03-22
Payer: MEDICARE

## 2023-03-22 NOTE — PERIOP NOTE
Preop department called to notify patient of arrival time for scheduled procedure. Instructions given to   - Arrive at [address]. - Remain NPO after midnight, unless otherwise indicated, including gum, mints, and ice chips. - Have a responsible adult to drive patient to the hospital, stay during surgery, and patient will need supervision 24 hours after anesthesia. - Use antibacterial soap in shower the night before surgery and on the morning of surgery.        Was patient contacted: yes  Voicemail left: n/a  Numbers contacted: [phone]   Arrival time: 0900

## 2023-03-23 ENCOUNTER — HOSPITAL ENCOUNTER (OUTPATIENT)
Age: 78
Setting detail: OUTPATIENT SURGERY
Discharge: HOME | End: 2023-03-23
Attending: ORTHOPAEDIC SURGERY | Admitting: ORTHOPAEDIC SURGERY
Payer: MEDICARE

## 2023-03-23 ENCOUNTER — ANESTHESIA (OUTPATIENT)
Dept: SURGERY | Age: 78
End: 2023-03-23
Payer: MEDICARE

## 2023-03-23 ENCOUNTER — APPOINTMENT (OUTPATIENT)
Dept: GENERAL RADIOLOGY | Age: 78
End: 2023-03-23
Attending: ORTHOPAEDIC SURGERY
Payer: MEDICARE

## 2023-03-23 VITALS
HEIGHT: 64 IN | SYSTOLIC BLOOD PRESSURE: 131 MMHG | OXYGEN SATURATION: 99 % | WEIGHT: 225 LBS | DIASTOLIC BLOOD PRESSURE: 63 MMHG | RESPIRATION RATE: 12 BRPM | BODY MASS INDEX: 38.41 KG/M2 | TEMPERATURE: 97 F | HEART RATE: 75 BPM

## 2023-03-23 DIAGNOSIS — M20.11 HALLUX VALGUS, RIGHT: ICD-10-CM

## 2023-03-23 DIAGNOSIS — M20.41 HAMMER TOE OF RIGHT FOOT: ICD-10-CM

## 2023-03-23 DIAGNOSIS — G89.18 ACUTE POST-OPERATIVE PAIN: Primary | ICD-10-CM

## 2023-03-23 PROCEDURE — 7100000011 HC PHASE II RECOVERY - ADDTL 15 MIN: Performed by: ORTHOPAEDIC SURGERY

## 2023-03-23 PROCEDURE — 7100000010 HC PHASE II RECOVERY - FIRST 15 MIN: Performed by: ORTHOPAEDIC SURGERY

## 2023-03-23 PROCEDURE — 6360000002 HC RX W HCPCS: Performed by: NURSE ANESTHETIST, CERTIFIED REGISTERED

## 2023-03-23 PROCEDURE — 2709999900 HC NON-CHARGEABLE SUPPLY: Performed by: ORTHOPAEDIC SURGERY

## 2023-03-23 PROCEDURE — 6360000002 HC RX W HCPCS: Performed by: ANESTHESIOLOGY

## 2023-03-23 PROCEDURE — 3600000014 HC SURGERY LEVEL 4 ADDTL 15MIN: Performed by: ORTHOPAEDIC SURGERY

## 2023-03-23 PROCEDURE — 2720000010 HC SURG SUPPLY STERILE: Performed by: ORTHOPAEDIC SURGERY

## 2023-03-23 PROCEDURE — C1713 ANCHOR/SCREW BN/BN,TIS/BN: HCPCS | Performed by: ORTHOPAEDIC SURGERY

## 2023-03-23 PROCEDURE — 2580000003 HC RX 258: Performed by: NURSE ANESTHETIST, CERTIFIED REGISTERED

## 2023-03-23 PROCEDURE — 3700000001 HC ADD 15 MINUTES (ANESTHESIA): Performed by: ORTHOPAEDIC SURGERY

## 2023-03-23 PROCEDURE — 2500000003 HC RX 250 WO HCPCS: Performed by: NURSE ANESTHETIST, CERTIFIED REGISTERED

## 2023-03-23 PROCEDURE — 3700000000 HC ANESTHESIA ATTENDED CARE: Performed by: ORTHOPAEDIC SURGERY

## 2023-03-23 PROCEDURE — 2580000003 HC RX 258: Performed by: ANESTHESIOLOGY

## 2023-03-23 PROCEDURE — 3600000004 HC SURGERY LEVEL 4 BASE: Performed by: ORTHOPAEDIC SURGERY

## 2023-03-23 PROCEDURE — 64450 NJX AA&/STRD OTHER PN/BRANCH: CPT | Performed by: ANESTHESIOLOGY

## 2023-03-23 PROCEDURE — 7100000001 HC PACU RECOVERY - ADDTL 15 MIN: Performed by: ORTHOPAEDIC SURGERY

## 2023-03-23 PROCEDURE — 6360000002 HC RX W HCPCS: Performed by: NURSE PRACTITIONER

## 2023-03-23 PROCEDURE — 7100000000 HC PACU RECOVERY - FIRST 15 MIN: Performed by: ORTHOPAEDIC SURGERY

## 2023-03-23 PROCEDURE — 6370000000 HC RX 637 (ALT 250 FOR IP): Performed by: ANESTHESIOLOGY

## 2023-03-23 DEVICE — IMPLANTABLE DEVICE
Type: IMPLANTABLE DEVICE | Site: FOOT | Status: FUNCTIONAL
Brand: ORTHOLOC 3DI

## 2023-03-23 DEVICE — BOW PLATE
Type: IMPLANTABLE DEVICE | Site: FOOT | Status: FUNCTIONAL
Brand: ORTHOLOC 3DI

## 2023-03-23 DEVICE — SCREW
Type: IMPLANTABLE DEVICE | Site: FOOT | Status: FUNCTIONAL
Brand: DARTFIRE EDGE

## 2023-03-23 DEVICE — IMPLANTABLE DEVICE
Type: IMPLANTABLE DEVICE | Site: FOOT | Status: FUNCTIONAL
Brand: FUSEFORCE

## 2023-03-23 RX ORDER — SODIUM CHLORIDE 0.9 % (FLUSH) 0.9 %
5-40 SYRINGE (ML) INJECTION PRN
Status: DISCONTINUED | OUTPATIENT
Start: 2023-03-23 | End: 2023-03-23 | Stop reason: HOSPADM

## 2023-03-23 RX ORDER — ROPIVACAINE HYDROCHLORIDE 5 MG/ML
INJECTION, SOLUTION EPIDURAL; INFILTRATION; PERINEURAL
Status: COMPLETED | OUTPATIENT
Start: 2023-03-23 | End: 2023-03-23

## 2023-03-23 RX ORDER — SODIUM CHLORIDE 9 MG/ML
INJECTION, SOLUTION INTRAVENOUS PRN
Status: DISCONTINUED | OUTPATIENT
Start: 2023-03-23 | End: 2023-03-23 | Stop reason: HOSPADM

## 2023-03-23 RX ORDER — ONDANSETRON 2 MG/ML
4 INJECTION INTRAMUSCULAR; INTRAVENOUS
Status: DISCONTINUED | OUTPATIENT
Start: 2023-03-23 | End: 2023-03-23 | Stop reason: HOSPADM

## 2023-03-23 RX ORDER — HYDROMORPHONE HYDROCHLORIDE 2 MG/ML
0.5 INJECTION, SOLUTION INTRAMUSCULAR; INTRAVENOUS; SUBCUTANEOUS EVERY 5 MIN PRN
Status: DISCONTINUED | OUTPATIENT
Start: 2023-03-23 | End: 2023-03-23 | Stop reason: HOSPADM

## 2023-03-23 RX ORDER — PROPOFOL 10 MG/ML
INJECTION, EMULSION INTRAVENOUS CONTINUOUS PRN
Status: DISCONTINUED | OUTPATIENT
Start: 2023-03-23 | End: 2023-03-23 | Stop reason: SDUPTHER

## 2023-03-23 RX ORDER — MIDAZOLAM HYDROCHLORIDE 2 MG/2ML
2 INJECTION, SOLUTION INTRAMUSCULAR; INTRAVENOUS
Status: COMPLETED | OUTPATIENT
Start: 2023-03-23 | End: 2023-03-23

## 2023-03-23 RX ORDER — FENTANYL CITRATE 50 UG/ML
100 INJECTION, SOLUTION INTRAMUSCULAR; INTRAVENOUS
Status: COMPLETED | OUTPATIENT
Start: 2023-03-23 | End: 2023-03-23

## 2023-03-23 RX ORDER — HYDROMORPHONE HYDROCHLORIDE 2 MG/ML
0.25 INJECTION, SOLUTION INTRAMUSCULAR; INTRAVENOUS; SUBCUTANEOUS EVERY 5 MIN PRN
Status: DISCONTINUED | OUTPATIENT
Start: 2023-03-23 | End: 2023-03-23 | Stop reason: HOSPADM

## 2023-03-23 RX ORDER — PROPOFOL 10 MG/ML
INJECTION, EMULSION INTRAVENOUS PRN
Status: DISCONTINUED | OUTPATIENT
Start: 2023-03-23 | End: 2023-03-23 | Stop reason: SDUPTHER

## 2023-03-23 RX ORDER — LIDOCAINE HYDROCHLORIDE 10 MG/ML
1 INJECTION, SOLUTION INFILTRATION; PERINEURAL
Status: DISCONTINUED | OUTPATIENT
Start: 2023-03-23 | End: 2023-03-23 | Stop reason: HOSPADM

## 2023-03-23 RX ORDER — DIPHENHYDRAMINE HYDROCHLORIDE 50 MG/ML
12.5 INJECTION INTRAMUSCULAR; INTRAVENOUS
Status: DISCONTINUED | OUTPATIENT
Start: 2023-03-23 | End: 2023-03-23 | Stop reason: HOSPADM

## 2023-03-23 RX ORDER — LIDOCAINE HYDROCHLORIDE 20 MG/ML
INJECTION, SOLUTION EPIDURAL; INFILTRATION; INTRACAUDAL; PERINEURAL
Status: COMPLETED | OUTPATIENT
Start: 2023-03-23 | End: 2023-03-23

## 2023-03-23 RX ORDER — SODIUM CHLORIDE, SODIUM LACTATE, POTASSIUM CHLORIDE, CALCIUM CHLORIDE 600; 310; 30; 20 MG/100ML; MG/100ML; MG/100ML; MG/100ML
INJECTION, SOLUTION INTRAVENOUS CONTINUOUS
Status: DISCONTINUED | OUTPATIENT
Start: 2023-03-23 | End: 2023-03-23 | Stop reason: HOSPADM

## 2023-03-23 RX ORDER — SODIUM CHLORIDE 0.9 % (FLUSH) 0.9 %
5-40 SYRINGE (ML) INJECTION EVERY 12 HOURS SCHEDULED
Status: DISCONTINUED | OUTPATIENT
Start: 2023-03-23 | End: 2023-03-23 | Stop reason: HOSPADM

## 2023-03-23 RX ORDER — SODIUM CHLORIDE, SODIUM LACTATE, POTASSIUM CHLORIDE, CALCIUM CHLORIDE 600; 310; 30; 20 MG/100ML; MG/100ML; MG/100ML; MG/100ML
INJECTION, SOLUTION INTRAVENOUS CONTINUOUS PRN
Status: DISCONTINUED | OUTPATIENT
Start: 2023-03-23 | End: 2023-03-23 | Stop reason: SDUPTHER

## 2023-03-23 RX ORDER — ESMOLOL HYDROCHLORIDE 10 MG/ML
INJECTION INTRAVENOUS PRN
Status: DISCONTINUED | OUTPATIENT
Start: 2023-03-23 | End: 2023-03-23 | Stop reason: SDUPTHER

## 2023-03-23 RX ORDER — SULFAMETHOXAZOLE AND TRIMETHOPRIM 800; 160 MG/1; MG/1
1 TABLET ORAL 2 TIMES DAILY
Qty: 6 TABLET | Refills: 0 | Status: SHIPPED | OUTPATIENT
Start: 2023-03-23 | End: 2023-03-26

## 2023-03-23 RX ORDER — OXYCODONE HYDROCHLORIDE 5 MG/1
5 TABLET ORAL EVERY 6 HOURS PRN
Qty: 20 TABLET | Refills: 0 | Status: SHIPPED | OUTPATIENT
Start: 2023-03-23 | End: 2023-03-28

## 2023-03-23 RX ORDER — CIPROFLOXACIN 500 MG/1
500 TABLET, FILM COATED ORAL 2 TIMES DAILY
Qty: 6 TABLET | Refills: 0 | Status: SHIPPED | OUTPATIENT
Start: 2023-03-23 | End: 2023-03-23 | Stop reason: HOSPADM

## 2023-03-23 RX ORDER — LIDOCAINE HYDROCHLORIDE 20 MG/ML
INJECTION, SOLUTION EPIDURAL; INFILTRATION; INTRACAUDAL; PERINEURAL PRN
Status: DISCONTINUED | OUTPATIENT
Start: 2023-03-23 | End: 2023-03-23 | Stop reason: SDUPTHER

## 2023-03-23 RX ORDER — OXYCODONE HYDROCHLORIDE 5 MG/1
5 TABLET ORAL ONCE
Status: COMPLETED | OUTPATIENT
Start: 2023-03-23 | End: 2023-03-23

## 2023-03-23 RX ORDER — PROCHLORPERAZINE EDISYLATE 5 MG/ML
5 INJECTION INTRAMUSCULAR; INTRAVENOUS
Status: DISCONTINUED | OUTPATIENT
Start: 2023-03-23 | End: 2023-03-23 | Stop reason: HOSPADM

## 2023-03-23 RX ADMIN — SODIUM CHLORIDE, POTASSIUM CHLORIDE, SODIUM LACTATE AND CALCIUM CHLORIDE: 600; 310; 30; 20 INJECTION, SOLUTION INTRAVENOUS at 10:56

## 2023-03-23 RX ADMIN — MIDAZOLAM 2 MG: 1 INJECTION INTRAMUSCULAR; INTRAVENOUS at 10:59

## 2023-03-23 RX ADMIN — OXYCODONE HYDROCHLORIDE 5 MG: 5 TABLET ORAL at 13:29

## 2023-03-23 RX ADMIN — Medication 2 G: at 11:35

## 2023-03-23 RX ADMIN — ESMOLOL HYDROCHLORIDE 30 MG: 10 INJECTION INTRAVENOUS at 11:55

## 2023-03-23 RX ADMIN — PROPOFOL 40 MG: 10 INJECTION, EMULSION INTRAVENOUS at 11:32

## 2023-03-23 RX ADMIN — PROPOFOL 100 MCG/KG/MIN: 10 INJECTION, EMULSION INTRAVENOUS at 11:32

## 2023-03-23 RX ADMIN — SODIUM CHLORIDE, SODIUM LACTATE, POTASSIUM CHLORIDE, AND CALCIUM CHLORIDE: 600; 310; 30; 20 INJECTION, SOLUTION INTRAVENOUS at 11:24

## 2023-03-23 RX ADMIN — FENTANYL CITRATE 100 MCG: 50 INJECTION INTRAMUSCULAR; INTRAVENOUS at 10:59

## 2023-03-23 RX ADMIN — LIDOCAINE HYDROCHLORIDE 10 ML: 20 INJECTION, SOLUTION EPIDURAL; INFILTRATION; INTRACAUDAL; PERINEURAL at 10:59

## 2023-03-23 RX ADMIN — LIDOCAINE HYDROCHLORIDE 50 MG: 20 INJECTION, SOLUTION EPIDURAL; INFILTRATION; INTRACAUDAL; PERINEURAL at 11:32

## 2023-03-23 RX ADMIN — ROPIVACAINE HYDROCHLORIDE 30 ML: 5 INJECTION, SOLUTION EPIDURAL; INFILTRATION; PERINEURAL at 10:59

## 2023-03-23 ASSESSMENT — PAIN - FUNCTIONAL ASSESSMENT: PAIN_FUNCTIONAL_ASSESSMENT: 0-10

## 2023-03-23 ASSESSMENT — PAIN SCALES - GENERAL
PAINLEVEL_OUTOF10: 0
PAINLEVEL_OUTOF10: 0
PAINLEVEL_OUTOF10: 4
PAINLEVEL_OUTOF10: 0

## 2023-03-23 ASSESSMENT — PAIN DESCRIPTION - ORIENTATION: ORIENTATION: RIGHT

## 2023-03-23 ASSESSMENT — PAIN DESCRIPTION - DESCRIPTORS: DESCRIPTORS: SORE

## 2023-03-23 ASSESSMENT — PAIN DESCRIPTION - LOCATION: LOCATION: FOOT

## 2023-03-23 NOTE — H&P
calcaneal autograft harvest, right second and third PIP resection arthroplasties and open Weil osteotomies. History and physical has been reviewed. The patient has been examined. There have been no significant clinical changes since the completion of the originally dated History and Physical.  Patient identified by surgeon; surgical site was confirmed by patient and surgeon. The patient is here today for outpatient surgery. I have examined the patient, no changes are noted in the patient's medical status. Necessity for the procedure/care is still present and the history and physical above is current. See the office notes for the full long term history of the problem. Please see the recent office notes for the musculoskeletal examination.     Signed By: CIPRIANO Mena CNP     March 23, 2023 9:19 AM

## 2023-03-23 NOTE — PROGRESS NOTES
Discharge instructions reviewed with patient and family/visitor. Verbalizes understanding and no further questions received.
Discharge instructions reviewed with patient and family/visitor. Verbalizes understanding and no further questions received.
no

## 2023-03-23 NOTE — ANESTHESIA POSTPROCEDURE EVALUATION
Department of Anesthesiology  Postprocedure Note    Patient: Spenser Pulliam  MRN: [id number]  [place]: 1945  Date of evaluation: 3/23/2023      Procedure Summary     Date: 03/23/23 Room / Location: [facility] OP OR 02 / [facility]    Anesthesia Start: 1124 Anesthesia Stop: 1252    Procedures:       Right double level opening bunionectomy (Right: Foot)      right calcaneal autograft harvest (Right: Toes)      right second and third PIP(proximal interphalangeal) resection arthroplasties (Right: Toes)      right second and third open Weil osteotomies (Right: Toes) Diagnosis:       Hammer toe of right foot      Hallux valgus, right      (Hammer toe of right foot [M20.41])      (Hallux valgus, right [M20.11])    Surgeons: Charlotte Perry MD Responsible Provider: Red Stanford MD    Anesthesia Type: TIVA ASA Status: 2          Anesthesia Type: No value filed.     Renae Phase I: Renae Score: 8    Renae Phase II: Renae Score: 10      Anesthesia Post Evaluation    Patient location during evaluation: PACU  Patient participation: complete - patient participated  Level of consciousness: awake and alert  Airway patency: patent  Nausea & Vomiting: no nausea and no vomiting  Complications: no  Cardiovascular status: hemodynamically stable  Respiratory status: acceptable, nonlabored ventilation and spontaneous ventilation  Hydration status: euvolemic  Comments: /63   Pulse 75   Temp 97 °F (36.1 °C)   Resp 12   Ht 5' 4\" (1.626 m)   Wt 225 lb (102.1 kg)   SpO2 99%   BMI 38.62 kg/m²     Multimodal analgesia pain management approach

## 2023-03-23 NOTE — ANESTHESIA PRE PROCEDURE
Component Value Date/Time     02/22/2023 09:59 AM    K 4.4 02/22/2023 09:59 AM     02/22/2023 09:59 AM    CO2 27 02/22/2023 09:59 AM    BUN 19 02/22/2023 09:59 AM    CREATININE 1.00 02/22/2023 09:59 AM    GFRAA >60 08/25/2022 10:27 AM    AGRATIO 2.2 02/14/2022 09:56 AM    LABGLOM 59 02/22/2023 09:59 AM    GLUCOSE 103 02/22/2023 09:59 AM    PROT 6.3 02/22/2023 09:59 AM    CALCIUM 8.7 02/22/2023 09:59 AM    BILITOT 0.5 02/22/2023 09:59 AM    ALKPHOS 55 02/22/2023 09:59 AM    ALKPHOS 65 02/14/2022 09:56 AM    AST 21 02/22/2023 09:59 AM    ALT 26 02/22/2023 09:59 AM       POC Tests: No results for input(s): POCGLU, POCNA, POCK, POCCL, POCBUN, POCHEMO, POCHCT in the last 72 hours. Coags: No results found for: PROTIME, INR, APTT    HCG (If Applicable): No results found for: PREGTESTUR, PREGSERUM, HCG, HCGQUANT     ABGs: No results found for: PHART, PO2ART, HQT2OWL, WEE6LMA, BEART, K6MXQFEZ     Type & Screen (If Applicable):  No results found for: LABABO, LABRH    Drug/Infectious Status (If Applicable):  No results found for: HIV, HEPCAB    COVID-19 Screening (If Applicable): No results found for: COVID19        Anesthesia Evaluation  Patient summary reviewed and Nursing notes reviewed  Airway: Mallampati: II  TM distance: >3 FB   Neck ROM: full  Mouth opening: > = 3 FB   Dental:    (+) implants and caps      Pulmonary: breath sounds clear to auscultation  (+) pneumonia (in fall of 2022, completely recovered): resolved,                             Cardiovascular:  Exercise tolerance: good (>4 METS),           Rhythm: regular  Rate: normal                    Neuro/Psych:               GI/Hepatic/Renal:   (+) GERD: well controlled, renal disease: CRI,           Endo/Other:    (+) hypothyroidism::., .                 Abdominal:             Vascular:           Other Findings:           Anesthesia Plan      TIVA     ASA 2             Anesthetic plan and risks discussed with patient and

## 2023-03-23 NOTE — DISCHARGE INSTRUCTIONS
POSTOPERATIVE SURGICAL INSTRUCTIONS/ INFORMATION:    Your narcotic (pain medication) and an antibiotic will be sent to the pharmacy listed in your chart. Both of these medications should be taken as directed on the bottle. If the surgery you had requires you to be nonweightbearing, in addition to the narcotic and antibiotic you will also have an aspirin prescription that should also be taken as directed on the bottle. This will be taken until you are told to discontinue it. If you run out of the prescription of aspirin and over-the-counter 81 mg aspirin will work as well. Nonweightbearing to the affected extremity means you are not allowed to put pressure or any weight on the surgical extremity. Information regarding scooters, crutches and other assistive devices will have been discussed at your presurgical office visit these devices are to be used until told otherwise by the doctor or nurse practitioner. Pain can be hard to manage postoperatively especially if you had an anesthetic nerve block and do not know when it will wear off. It is recommended that you start taking pain medication even with an anesthetic block the night of surgery. The anesthetic nerve block can last up to 72 hours postoperatively, your leg will likely be numb as long as the anesthetic block is working. If you are taking the pain medication as directed on the bottle and your pain is not managed or controlled you may double the medication, taking 2 tablets every 4-6 hours as needed for 24 hours. Once pain level is controlled you will resume the recommended dosage on the bottle. Pain medication may cause constipation, to help minimize this ensure you are drinking plenty of water after surgery. You may start a stool softener and/or MiraLAX to help alleviate or mitigate this problem. Please take these over-the-counter products as directed on the bottle.     Please make every effort to leave your postoperative dressing

## 2023-03-23 NOTE — OP NOTE
Operative Note    Patient:Julie Jerrilyn Castleman  MRN: [id number]    Date Of Surgery: 3/23/2023    Surgeon: Ronal Rob MD    Assistant Surgeon: None    Pre Op Diagnosis:  Hammer toe of right foot [M20.41]  Hallux valgus, right [M20.11]    Post Op Diagnosis:   same    Procedures Performed:  Right double level bunionectomy, 15659  Right calcaneal autograft harvest, 70175  Right second and third proximal interphalangeal joint resection arthroplasties, 39801N4  Right second and third distal metatarsal Weil osteotomies, 28308x2    Implants:   Implant Name Type Inv.  Item Serial No.  Lot No. LRB No. Used Action   STAPLE BNE [id number] NIT SHP MEM COMPRESSIVE FORC DISP - [id number]  STAPLE BNE [id number] NIT 14[email or web address] MEM COMPRESSIVE Katharine Kortney Chadwick [employer] [id number] Right 1 Implanted   PLATE BNE [id number] 4 H TI MIDFOOT FOREFOOT BILAT POLYAX SAI - [id number]  PLATE BNE [id number] 4 H TI MIDFOOT FOREFOOT BILAT POLYAX SAI  [employer] [facility] [id number] Right 1 Implanted   SCREW BNE L22MM DIA3.5MM RICARDO FT ANK TI SAI FULL THRD FOR - [id number]  SCREW BNE L22MM DIA3.5MM RICARDO FT ANK TI SAI FULL THRD FOR  [employer] [facility] [id number] Right 1 Implanted   SCREW BNE L20MM DIA3.5MM RICARDO FT ANK TI SAI FULL THRD FOR - [id number]  SCREW BNE L20MM DIA3.5MM RICARDO FT ANK TI SAI FULL THRD FOR  [employer] [facility] [id number] Right 2 Implanted   SCREW BNE L16MM DIA3.5MM RICARDO FT ANK TI SAI FULL THRD FOR - [id number]  SCREW BNE L16MM DIA3.5MM RICARDO FT ANK TI SAI FULL THRD FOR  [employer] [facility] [id number] Right 1 Implanted   SCREW DART-FIRE SNAP OFF 2.0MM X 11MM - [id number]  SCREW DART-FIRE SNAP OFF 2.0MM X 11MM  [employer] [facility] [id number] Right 2 Implanted       Anesthesia:  Choice    Blood Loss:  minimal    Tourniquet:  Estimated ankle 30 minutes    Pre Operative Abx:   Ancef 3g            Pre Operative Course:  Monie Eaton is a 78 y.o. female who has a

## 2023-03-23 NOTE — ANESTHESIA PROCEDURE NOTES
Peripheral Block    Patient location during procedure: procedure area  Reason for block: post-op pain management and at surgeon's request  Start time: 3/23/2023 10:59 AM  End time: 3/23/2023 11:05 AM  Staffing  Performed: anesthesiologist   Anesthesiologist: Janice Alfaro MD  Preanesthetic Checklist  Completed: patient identified, IV checked, site marked, risks and benefits discussed, surgical/procedural consents, equipment checked, pre-op evaluation, timeout performed, anesthesia consent given, oxygen available and monitors applied/VS acknowledged  Peripheral Block   Patient position: supine  Prep: ChloraPrep  Provider prep: mask  Patient monitoring: cardiac monitor, continuous pulse ox, frequent blood pressure checks, IV access, oxygen and responsive to questions  Block type: Ankle  Laterality: right  Injection technique: single-shot  Guidance: other    Needle   Needle gauge: 25g.   Needle localization: anatomical landmarks  Needle length: 5 cm  Assessment   Injection assessment: negative aspiration for heme, no paresthesia on injection and no intravascular symptoms  Slow fractionated injection: yes  Hemodynamics: stableno  Outcomes: uncomplicated and patient tolerated procedure well    Medications Administered  lidocaine PF 2 % - Perineural   10 mL - 3/23/2023 10:59:00 AM  ropivacaine (NAROPIN) injection 0.5% - Perineural   30 mL - 3/23/2023 10:59:00 AM

## 2023-03-27 NOTE — TELEPHONE ENCOUNTER
Just to clarify - she is taking 1.5 of the 1 mg pills, correct? That's what I had suggested she try. If so, I will get a modified prescription sent in.

## 2023-03-28 ENCOUNTER — TELEPHONE (OUTPATIENT)
Dept: ORTHOPEDIC SURGERY | Age: 78
End: 2023-03-28

## 2023-03-28 ENCOUNTER — NURSE ONLY (OUTPATIENT)
Dept: INTERNAL MEDICINE CLINIC | Facility: CLINIC | Age: 78
End: 2023-03-28
Payer: MEDICARE

## 2023-03-28 DIAGNOSIS — F51.04 CHRONIC INSOMNIA: ICD-10-CM

## 2023-03-28 DIAGNOSIS — Z87.440 HISTORY OF UTI: Primary | ICD-10-CM

## 2023-03-28 LAB
BILIRUBIN, URINE, POC: NEGATIVE
BLOOD URINE, POC: NEGATIVE
GLUCOSE URINE, POC: NEGATIVE
KETONES, URINE, POC: NEGATIVE
LEUKOCYTE ESTERASE, URINE, POC: NEGATIVE
NITRITE, URINE, POC: NEGATIVE
PH, URINE, POC: 5.5 (ref 4.6–8)
PROTEIN,URINE, POC: NEGATIVE
SPECIFIC GRAVITY, URINE, POC: 1 (ref 1–1.03)
URINALYSIS CLARITY, POC: CLEAR
URINALYSIS COLOR, POC: YELLOW
UROBILINOGEN, POC: NORMAL

## 2023-03-28 PROCEDURE — 81003 URINALYSIS AUTO W/O SCOPE: CPT | Performed by: PHYSICIAN ASSISTANT

## 2023-03-28 RX ORDER — CLONAZEPAM 1 MG/1
1.5 TABLET ORAL NIGHTLY
Qty: 45 TABLET | Refills: 5 | Status: SHIPPED | OUTPATIENT
Start: 2023-03-28 | End: 2023-09-24

## 2023-03-28 RX ORDER — CLONAZEPAM 1 MG/1
1 TABLET ORAL NIGHTLY
Qty: 30 TABLET | Refills: 5 | OUTPATIENT
Start: 2023-03-28 | End: 2023-09-24

## 2023-03-28 NOTE — TELEPHONE ENCOUNTER
L/M on pt VM that I will also send her a [employer] message but yes she can come into the [facility] office for her dressing change

## 2023-03-30 ENCOUNTER — OFFICE VISIT (OUTPATIENT)
Dept: ORTHOPEDIC SURGERY | Age: 78
End: 2023-03-30

## 2023-03-30 DIAGNOSIS — M79.671 RIGHT FOOT PAIN: Primary | ICD-10-CM

## 2023-03-30 PROCEDURE — 99024 POSTOP FOLLOW-UP VISIT: CPT | Performed by: NURSE PRACTITIONER

## 2023-04-06 ENCOUNTER — TELEPHONE (OUTPATIENT)
Dept: ORTHOPEDIC SURGERY | Age: 78
End: 2023-04-06

## 2023-04-06 DIAGNOSIS — M79.671 RIGHT FOOT PAIN: ICD-10-CM

## 2023-04-06 DIAGNOSIS — M20.11 HALLUX VALGUS OF RIGHT FOOT: Primary | ICD-10-CM

## 2023-04-06 RX ORDER — SULFAMETHOXAZOLE AND TRIMETHOPRIM 800; 160 MG/1; MG/1
1 TABLET ORAL 2 TIMES DAILY
Qty: 14 TABLET | Refills: 0 | Status: SHIPPED | OUTPATIENT
Start: 2023-04-06 | End: 2023-04-13

## 2023-04-06 NOTE — TELEPHONE ENCOUNTER
She had surgery March 23. She says her whole foot is red. She is scheduled to get stitches removed on Monday. She is going to try to send a picture on my chart. Can you call to discuss?

## 2023-04-06 NOTE — TELEPHONE ENCOUNTER
Sent the message and picture to Tong Kent to see if pt needs to come into office 4/7?  I will let the pt know

## 2023-04-19 ENCOUNTER — TELEPHONE (OUTPATIENT)
Dept: ORTHOPEDIC SURGERY | Age: 78
End: 2023-04-19

## 2023-04-19 ENCOUNTER — OFFICE VISIT (OUTPATIENT)
Dept: ORTHOPEDIC SURGERY | Age: 78
End: 2023-04-19

## 2023-04-19 DIAGNOSIS — M20.41 HAMMER TOE OF RIGHT FOOT: ICD-10-CM

## 2023-04-19 DIAGNOSIS — M20.11 HALLUX VALGUS OF RIGHT FOOT: Primary | ICD-10-CM

## 2023-04-19 PROCEDURE — 99024 POSTOP FOLLOW-UP VISIT: CPT | Performed by: NURSE PRACTITIONER

## 2023-04-19 NOTE — PROGRESS NOTES
Name: June Fuentes  YOB: 1945  Gender: female  MRN: [id number]    Procedure Performed:  Right double level bunionectomy  Right calcaneal autograft harvest  Right second and third proximal interphalangeal joint resection arthroplasties  Right second and third distal metatarsal Weil osteotomies      Date of Procedure: 03/23/2023        Subjective: Patient is done well postoperatively. She has follow postoperative instructions as directed. She is very happy about having the pins pulled from her second and third toes today. Physical Examination: Patient's incisional areas are still continue to heal well and show no signs of infection. They are all currently scabbed. She did present to the visit today with intact K wires in both second and third phalanx. She has palpable pulses and intact sensation of the foot. She is able to wiggle the lesser toes without difficulty or pain. Range of motion to the great toe is well as the osteotomy site on the first metatarsal were performed without difficulty or pain. She has mild yet expected swelling to the affected extremity. Imaging:   I independently interpreted XR taken today  Right foot XR: AP, Lateral, Oblique views     ICD-10-CM    1. Hallux valgus of right foot  M20.11       2. Hammer toe of right foot  M20.41          Report: AP, lateral, oblique x-ray of the right foot demonstrates hallux valgus and hammertoe corrections without hardware failure    Impression: Valgus and hammertoe corrections without hardware failure   CIPRIANO Dickson - CNP           Assessment:   Status post right double level bunionectomy with calcaneal autograft harvest second third PIP RA Weil osteotomy. We discussed progression of care today as well as expectations until her next follow-up visit. Questions and concerns were addressed, she verbalized understanding of today's conversation.       Plan:   3 This is stable chronic

## 2023-04-28 RX ORDER — FAMOTIDINE 20 MG/1
TABLET, FILM COATED ORAL
Qty: 180 TABLET | Refills: 3 | OUTPATIENT
Start: 2023-04-28

## 2023-05-24 ENCOUNTER — OFFICE VISIT (OUTPATIENT)
Dept: ORTHOPEDIC SURGERY | Age: 78
End: 2023-05-24

## 2023-05-24 DIAGNOSIS — M20.41 HAMMER TOE OF RIGHT FOOT: Primary | ICD-10-CM

## 2023-05-24 DIAGNOSIS — M20.11 HALLUX VALGUS OF RIGHT FOOT: ICD-10-CM

## 2023-05-24 PROCEDURE — 99024 POSTOP FOLLOW-UP VISIT: CPT | Performed by: NURSE PRACTITIONER

## 2023-05-24 NOTE — PROGRESS NOTES
Name: Cindy Cruz  YOB: 1945  Gender: female  MRN: [id number]    Procedure Performed:  Right double level bunionectomy  Right calcaneal autograft harvest  Right second and third proximal interphalangeal joint resection arthroplasties  Right second and third distal metatarsal Weil osteotomies      Date of Procedure: 03/23/2023      Subjective: Patient is still frustrated at today's visit due to the amount of swelling she still has as well as her inability to fit into a comfortable shoe and the noted pain she still has. She states the pain is along the incisional area and she is concerned about the discoloration that presents itself on her foot is in the down position for any length of time. Physical Examination: Patient's incisional area is well-healed with no signs of infection. She does still have moderate swelling throughout the entire dorsal aspect of her foot as well as second and third phalanx and great toe. She can wiggle the lesser toes without difficulty or pain. There is no pain with palpation to the plantar aspect of the MTP joints. There is mild discoloration to the entire foot especially at and around the incisional area. She has great surgical correction alignment of the great toe. She has palpable pulses and intact sensation to the foot. Imaging:   I independently interpreted XR taken today  Right foot XR: AP, Lateral, Oblique views     ICD-10-CM    1.  Hammer toe of right foot  M20.41 XR FOOT RIGHT (MIN 3 VIEWS)      2. Hallux valgus of right foot  M20.11 XR FOOT RIGHT (MIN 3 VIEWS)         Report: AP, lateral, oblique x-ray of the right foot demonstrates hallux valgus and hammertoe corrections without hardware failure    Impression: Hallux valgus and hammertoe corrections without hardware failure   Tanya Son, APRN - CNP           Assessment:   Status post right double level bunionectomy with Akin osteotomy second third PIP RA and Weil

## 2023-05-31 ENCOUNTER — TELEPHONE (OUTPATIENT)
Dept: INTERNAL MEDICINE CLINIC | Facility: CLINIC | Age: 78
End: 2023-05-31

## 2023-05-31 NOTE — TELEPHONE ENCOUNTER
PA initiated on CMM through [facility] for Zorvolex 35 mg caps. CMM Key: [id number]. PA Case: [id number], Status: Approved, Coverage Starts on: 1/1/2023 12:00:00 AM, Coverage Ends on: 12/31/2023 12:00:00 AM. Questions? Contact [phone].

## 2023-07-19 ENCOUNTER — OFFICE VISIT (OUTPATIENT)
Dept: ORTHOPEDIC SURGERY | Age: 78
End: 2023-07-19
Payer: MEDICARE

## 2023-07-19 DIAGNOSIS — M20.41 HAMMER TOE OF RIGHT FOOT: ICD-10-CM

## 2023-07-19 DIAGNOSIS — M20.11 HALLUX VALGUS OF RIGHT FOOT: Primary | ICD-10-CM

## 2023-07-19 PROCEDURE — G8417 CALC BMI ABV UP PARAM F/U: HCPCS | Performed by: NURSE PRACTITIONER

## 2023-07-19 PROCEDURE — 1123F ACP DISCUSS/DSCN MKR DOCD: CPT | Performed by: NURSE PRACTITIONER

## 2023-07-19 PROCEDURE — 1036F TOBACCO NON-USER: CPT | Performed by: NURSE PRACTITIONER

## 2023-07-19 PROCEDURE — 3017F COLORECTAL CA SCREEN DOC REV: CPT | Performed by: NURSE PRACTITIONER

## 2023-07-19 PROCEDURE — 1090F PRES/ABSN URINE INCON ASSESS: CPT | Performed by: NURSE PRACTITIONER

## 2023-07-19 PROCEDURE — G8427 DOCREV CUR MEDS BY ELIG CLIN: HCPCS | Performed by: NURSE PRACTITIONER

## 2023-07-19 PROCEDURE — G8399 PT W/DXA RESULTS DOCUMENT: HCPCS | Performed by: NURSE PRACTITIONER

## 2023-07-19 PROCEDURE — 99214 OFFICE O/P EST MOD 30 MIN: CPT | Performed by: NURSE PRACTITIONER

## 2023-07-19 NOTE — PROGRESS NOTES
Name: Zana Wheeler  YOB: 1945  Gender: female  MRN: [id number]    Procedure Performed:  Right double level bunionectomy  Right calcaneal autograft harvest  Right second and third proximal interphalangeal joint resection arthroplasties  Right second and third distal metatarsal Weil osteotomies      Date of Procedure: 03/23/2023      Subjective: Patient has persistent dorsal foot swelling as well as pain with wearing certain shoes. She notes that certain shoes rub over the incision causing her pain. She reports at this point postoperatively that she is only able to wear certain shoes due to the daily swelling. She is frustrated and tearful at today's office visit. Physical Examination: The incisional areas overall look good and do appear to be well-healed at this point. She has significant swelling still to the dorsal aspect of her foot. She is able to wiggle the lesser toes without difficulty or pain. She still is well surgically corrected at the great toe. There is no pain with palpation throughout the arch of the foot or on the plantar aspect of the lesser MTP joints. Imaging:   I independently interpreted XR taken today  Right foot XR: AP, Lateral, Oblique views     ICD-10-CM    1. Hallux valgus of right foot  M20.11 XR FOOT RIGHT (MIN 3 VIEWS)      2. Hammer toe of right foot  M20.41 XR FOOT RIGHT (MIN 3 VIEWS)         Report: AP, lateral, oblique x-ray of the right foot demonstrates hallux valgus correction without hardware failure    Impression: Hallux valgus and hammertoe corrections without hardware failure   Maynor Acevedo, APRN - CNP           Assessment:   Status post right double level bunionectomy with calcaneal autograft harvest and second third PIP RA and Weil osteotomies. We discussed in all probability that the hardware is the culprit for her persistent pain and swelling. We discussed hardware removal today including procedure as well as time of recovery.

## 2023-07-20 ENCOUNTER — TELEPHONE (OUTPATIENT)
Dept: ORTHOPEDIC SURGERY | Age: 78
End: 2023-07-20

## 2023-07-20 DIAGNOSIS — T84.84XA PAINFUL ORTHOPAEDIC HARDWARE (HCC): Primary | ICD-10-CM

## 2023-07-20 NOTE — TELEPHONE ENCOUNTER
Patient called and wants an appointment at the [facility] location. She would also like to be seen sooner. Please call patient.

## 2023-07-24 ENCOUNTER — TELEPHONE (OUTPATIENT)
Dept: ORTHOPEDIC SURGERY | Age: 78
End: 2023-07-24

## 2023-07-24 NOTE — TELEPHONE ENCOUNTER
She has been trying to send a picture through [employer] and it won't work. Please call to let her know how she can get a picture to you. She states her foot is looking better and better and she may not need the hardware removal after all.

## 2023-07-24 NOTE — TELEPHONE ENCOUNTER
Patient stated she would like to schedule an appointment with [facility]. She also asked to cancel sx on 8/22. Sent [employer] a message to cancel sx and schedule an appoint with [facility].

## 2023-07-25 ENCOUNTER — CLINICAL DOCUMENTATION (OUTPATIENT)
Dept: ORTHOPEDIC SURGERY | Age: 78
End: 2023-07-25

## 2023-07-27 ENCOUNTER — TELEPHONE (OUTPATIENT)
Dept: ORTHOPEDIC SURGERY | Age: 78
End: 2023-07-27

## 2023-07-27 DIAGNOSIS — T84.84XA PAINFUL ORTHOPAEDIC HARDWARE (HCC): Primary | ICD-10-CM

## 2023-07-27 NOTE — TELEPHONE ENCOUNTER
Pt called would like to be put back on the schedule for sx she stated that she cx the other day but wants to be put back on please f/u with patient

## 2023-08-14 ENCOUNTER — OFFICE VISIT (OUTPATIENT)
Dept: ORTHOPEDIC SURGERY | Age: 78
End: 2023-08-14
Payer: MEDICARE

## 2023-08-14 DIAGNOSIS — M20.11 HALLUX VALGUS OF RIGHT FOOT: Primary | ICD-10-CM

## 2023-08-14 PROCEDURE — G8399 PT W/DXA RESULTS DOCUMENT: HCPCS | Performed by: ORTHOPAEDIC SURGERY

## 2023-08-14 PROCEDURE — 1090F PRES/ABSN URINE INCON ASSESS: CPT | Performed by: ORTHOPAEDIC SURGERY

## 2023-08-14 PROCEDURE — G8428 CUR MEDS NOT DOCUMENT: HCPCS | Performed by: ORTHOPAEDIC SURGERY

## 2023-08-14 PROCEDURE — 3017F COLORECTAL CA SCREEN DOC REV: CPT | Performed by: ORTHOPAEDIC SURGERY

## 2023-08-14 PROCEDURE — 1123F ACP DISCUSS/DSCN MKR DOCD: CPT | Performed by: ORTHOPAEDIC SURGERY

## 2023-08-14 PROCEDURE — 1036F TOBACCO NON-USER: CPT | Performed by: ORTHOPAEDIC SURGERY

## 2023-08-14 PROCEDURE — 99213 OFFICE O/P EST LOW 20 MIN: CPT | Performed by: ORTHOPAEDIC SURGERY

## 2023-08-14 PROCEDURE — G8417 CALC BMI ABV UP PARAM F/U: HCPCS | Performed by: ORTHOPAEDIC SURGERY

## 2023-08-14 NOTE — PROGRESS NOTES
Name: Marline Robertson  YOB: 1945  Gender: female  MRN: [id number]    Summary:   Right hallux valgus       CC: No chief complaint on file. HPI: Marline Robertson is a 78 y.o. female who presents with No chief complaint on file. .  This patient returns back to the office today for follow-up of her right foot. She is scheduled hardware removal but says she started feel little better and feel that she needs more time with physical therapy. History was obtained by Patient     ROS/Meds/PSH/PMH/FH/SH: I personally reviewed the patients standard intake form. Below are the pertinents    Tobacco:  reports that she has never smoked. She has never used smokeless tobacco.  Diabetes: None      Physical Examination:  Exam the right lower extremity is well-healed surgical incision. There is minimal pain over the hardware. She has expected stiffness and good alignment of the toe. There is no sign of infection. Imaging:   No imaging reviewed          Assessment:   Right double level bunionectomy    Treatment Plan:   4 This is a chronic illness/condition with exacerbation and progression  Treatment at this time: Physical Therapy  Studies ordered: NO XR needed @ Next Visit    Weight-bearing status: WBAT        Return to work/work restrictions: none  No medications given    She would like to try more supervised physical therapy which I think is reasonable.

## 2023-09-13 ENCOUNTER — OFFICE VISIT (OUTPATIENT)
Dept: ORTHOPEDIC SURGERY | Age: 78
End: 2023-09-13
Payer: MEDICARE

## 2023-09-13 DIAGNOSIS — M72.2 PLANTAR FASCIITIS, LEFT: Primary | ICD-10-CM

## 2023-09-13 DIAGNOSIS — M20.42 HAMMER TOE OF LEFT FOOT: ICD-10-CM

## 2023-09-13 PROCEDURE — 1123F ACP DISCUSS/DSCN MKR DOCD: CPT | Performed by: ORTHOPAEDIC SURGERY

## 2023-09-13 PROCEDURE — G8399 PT W/DXA RESULTS DOCUMENT: HCPCS | Performed by: ORTHOPAEDIC SURGERY

## 2023-09-13 PROCEDURE — G8427 DOCREV CUR MEDS BY ELIG CLIN: HCPCS | Performed by: ORTHOPAEDIC SURGERY

## 2023-09-13 PROCEDURE — 1090F PRES/ABSN URINE INCON ASSESS: CPT | Performed by: ORTHOPAEDIC SURGERY

## 2023-09-13 PROCEDURE — G8417 CALC BMI ABV UP PARAM F/U: HCPCS | Performed by: ORTHOPAEDIC SURGERY

## 2023-09-13 PROCEDURE — 99213 OFFICE O/P EST LOW 20 MIN: CPT | Performed by: ORTHOPAEDIC SURGERY

## 2023-09-13 PROCEDURE — 3017F COLORECTAL CA SCREEN DOC REV: CPT | Performed by: ORTHOPAEDIC SURGERY

## 2023-09-13 PROCEDURE — 1036F TOBACCO NON-USER: CPT | Performed by: ORTHOPAEDIC SURGERY

## 2023-09-13 NOTE — PROGRESS NOTES
Name: Zana Wheeler  YOB: 1945  Gender: female  MRN: [id number]    Summary:   Left new plantar fibroma and second hammertoe deformity       CC: Follow-up (Right foot (patient cx surgery on 09-07-23))       HPI: Zana Wheeler is a 78 y.o. female who presents with Follow-up (Right foot (patient cx surgery on 09-07-23))  . This patient presents back to the office today with a new onset left plantar fibroma pain as well as some continued pain in her right bunion. She continues in physical therapy making some progress on the right. History was obtained by Patient     ROS/Meds/PSH/PMH/FH/SH: I personally reviewed the patients standard intake form. Below are the pertinents    Tobacco:  reports that she has never smoked. She has never used smokeless tobacco.  Diabetes: None      Physical Examination:  Exam of the right foot shows well-healed surgical incision. Her swelling is improving. On the left there is a palpable mass located the plantar medial heel in the area of the plantar fascia. There is no adherence to the underlying tissues. There is a negative Tinel's over this area. Imaging:   No imaging reviewed           Cheli Michel III, MD           Assessment:   Right bunionectomy, left new onset plantar fibromatosis    Treatment Plan:   3 This is stable chronic illness/condition  Treatment at this time: Physical Therapy  Studies ordered: NO XR needed @ Next Visit    Weight-bearing status: WBAT        Return to work/work restrictions: none  No medications given    She is developing a mild hammertoe on the left second toe. She did not try a hammertoe loop pad provided today for this in addition to home exercise program to address the plantar fibromatosis. I recommended continuing holding off on hardware removal of the right foot and she is agreeable.

## 2024-05-05 DIAGNOSIS — E78.5 DYSLIPIDEMIA: ICD-10-CM

## 2024-05-06 RX ORDER — ROSUVASTATIN CALCIUM 10 MG/1
10 TABLET, COATED ORAL NIGHTLY
Qty: 90 TABLET | Refills: 3 | OUTPATIENT
Start: 2024-05-06

## 2025-02-19 ENCOUNTER — TELEPHONE (OUTPATIENT)
Age: 80
End: 2025-02-19

## 2025-02-19 NOTE — TELEPHONE ENCOUNTER
Patient called stating she has the following issues :    Erratic HR  HR=64 - 144  Onset 4 weeks  Worst while lying down

## 2025-02-19 NOTE — TELEPHONE ENCOUNTER
Patient reports she has been under a lot of stress lately and her HR has been erratic. Rate . Worse when lying down. Patient has not been seen since 2021. Advised she go see her PCP. I made her an appt with Dr. Grey per patient request- scheduled for 3/6/25 at 2:15 pm. Patient to see PCP.

## 2025-02-24 RX ORDER — FAMOTIDINE 20 MG/1
20 TABLET, FILM COATED ORAL 2 TIMES DAILY
Qty: 180 TABLET | Refills: 3 | OUTPATIENT
Start: 2025-02-24

## 2025-03-06 ENCOUNTER — OFFICE VISIT (OUTPATIENT)
Age: 80
End: 2025-03-06
Payer: MEDICARE

## 2025-03-06 VITALS
SYSTOLIC BLOOD PRESSURE: 104 MMHG | DIASTOLIC BLOOD PRESSURE: 70 MMHG | HEART RATE: 82 BPM | WEIGHT: 226 LBS | HEIGHT: 64 IN | BODY MASS INDEX: 38.58 KG/M2

## 2025-03-06 DIAGNOSIS — R00.2 PALPITATIONS: Primary | ICD-10-CM

## 2025-03-06 PROCEDURE — 1159F MED LIST DOCD IN RCRD: CPT | Performed by: INTERNAL MEDICINE

## 2025-03-06 PROCEDURE — 1036F TOBACCO NON-USER: CPT | Performed by: INTERNAL MEDICINE

## 2025-03-06 PROCEDURE — 1090F PRES/ABSN URINE INCON ASSESS: CPT | Performed by: INTERNAL MEDICINE

## 2025-03-06 PROCEDURE — 1123F ACP DISCUSS/DSCN MKR DOCD: CPT | Performed by: INTERNAL MEDICINE

## 2025-03-06 PROCEDURE — G8417 CALC BMI ABV UP PARAM F/U: HCPCS | Performed by: INTERNAL MEDICINE

## 2025-03-06 PROCEDURE — G8399 PT W/DXA RESULTS DOCUMENT: HCPCS | Performed by: INTERNAL MEDICINE

## 2025-03-06 PROCEDURE — 99213 OFFICE O/P EST LOW 20 MIN: CPT | Performed by: INTERNAL MEDICINE

## 2025-03-06 PROCEDURE — 1126F AMNT PAIN NOTED NONE PRSNT: CPT | Performed by: INTERNAL MEDICINE

## 2025-03-06 PROCEDURE — 93000 ELECTROCARDIOGRAM COMPLETE: CPT | Performed by: INTERNAL MEDICINE

## 2025-03-06 PROCEDURE — 1160F RVW MEDS BY RX/DR IN RCRD: CPT | Performed by: INTERNAL MEDICINE

## 2025-03-06 PROCEDURE — G8427 DOCREV CUR MEDS BY ELIG CLIN: HCPCS | Performed by: INTERNAL MEDICINE

## 2025-03-06 RX ORDER — METOPROLOL SUCCINATE 25 MG/1
25 TABLET, EXTENDED RELEASE ORAL DAILY
COMMUNITY
Start: 2025-02-24

## 2025-03-06 ASSESSMENT — ENCOUNTER SYMPTOMS
HOARSE VOICE: 0
COUGH: 0
HEMATOCHEZIA: 0
SPUTUM PRODUCTION: 0
ORTHOPNEA: 0
NAUSEA: 0
BOWEL INCONTINENCE: 0
VOMITING: 0
ABDOMINAL PAIN: 0
WHEEZING: 0
DIARRHEA: 0
SHORTNESS OF BREATH: 0
HEMATEMESIS: 0
COLOR CHANGE: 0
BLURRED VISION: 0

## 2025-03-06 NOTE — PROGRESS NOTES
Lead  -     Extended cardiac holter monitor (3 days - 15 days); Future          Disposition:    Return in about 6 weeks (around 4/17/2025) for Follow up after procedure.                SENAIT CROWE MD  3/6/2025  3:17 PM

## 2025-04-10 ENCOUNTER — OFFICE VISIT (OUTPATIENT)
Age: 80
End: 2025-04-10
Payer: MEDICARE

## 2025-04-10 VITALS
HEART RATE: 62 BPM | BODY MASS INDEX: 33.36 KG/M2 | DIASTOLIC BLOOD PRESSURE: 70 MMHG | HEIGHT: 64 IN | WEIGHT: 195.4 LBS | SYSTOLIC BLOOD PRESSURE: 100 MMHG

## 2025-04-10 DIAGNOSIS — I47.10 PSVT (PAROXYSMAL SUPRAVENTRICULAR TACHYCARDIA): Primary | ICD-10-CM

## 2025-04-10 PROCEDURE — 1160F RVW MEDS BY RX/DR IN RCRD: CPT | Performed by: INTERNAL MEDICINE

## 2025-04-10 PROCEDURE — 1090F PRES/ABSN URINE INCON ASSESS: CPT | Performed by: INTERNAL MEDICINE

## 2025-04-10 PROCEDURE — 99213 OFFICE O/P EST LOW 20 MIN: CPT | Performed by: INTERNAL MEDICINE

## 2025-04-10 PROCEDURE — G8417 CALC BMI ABV UP PARAM F/U: HCPCS | Performed by: INTERNAL MEDICINE

## 2025-04-10 PROCEDURE — 1159F MED LIST DOCD IN RCRD: CPT | Performed by: INTERNAL MEDICINE

## 2025-04-10 PROCEDURE — G8427 DOCREV CUR MEDS BY ELIG CLIN: HCPCS | Performed by: INTERNAL MEDICINE

## 2025-04-10 PROCEDURE — G8399 PT W/DXA RESULTS DOCUMENT: HCPCS | Performed by: INTERNAL MEDICINE

## 2025-04-10 PROCEDURE — 1126F AMNT PAIN NOTED NONE PRSNT: CPT | Performed by: INTERNAL MEDICINE

## 2025-04-10 PROCEDURE — 1123F ACP DISCUSS/DSCN MKR DOCD: CPT | Performed by: INTERNAL MEDICINE

## 2025-04-10 PROCEDURE — 1036F TOBACCO NON-USER: CPT | Performed by: INTERNAL MEDICINE

## 2025-04-10 ASSESSMENT — ENCOUNTER SYMPTOMS
SPUTUM PRODUCTION: 0
HEMATEMESIS: 0
BLURRED VISION: 0
HOARSE VOICE: 0
ORTHOPNEA: 0
HEMATOCHEZIA: 0
SHORTNESS OF BREATH: 0
DIARRHEA: 0
ABDOMINAL PAIN: 0
WHEEZING: 0
COLOR CHANGE: 0
BOWEL INCONTINENCE: 0

## 2025-04-10 NOTE — PROGRESS NOTES
100/70   Pulse 62   Ht 1.626 m (5' 4\")   Wt 88.6 kg (195 lb 6.4 oz)   BMI 33.54 kg/m²      Physical Exam  Constitutional:       Appearance: Normal appearance.   HENT:      Head: Normocephalic and atraumatic.      Nose: Nose normal.   Eyes:      Extraocular Movements: Extraocular movements intact.      Pupils: Pupils are equal, round, and reactive to light.   Neck:      Vascular: No carotid bruit.   Cardiovascular:      Rate and Rhythm: Regular rhythm.      Pulses: Normal pulses.      Heart sounds: No murmur heard.  Pulmonary:      Effort: Pulmonary effort is normal.      Breath sounds: Normal breath sounds.   Abdominal:      General: Abdomen is flat. Bowel sounds are normal.      Palpations: Abdomen is soft.   Musculoskeletal:         General: Normal range of motion.      Cervical back: Normal range of motion and neck supple.   Skin:     General: Skin is warm and dry.   Neurological:      General: No focal deficit present.      Mental Status: She is alert and oriented to person, place, and time.   Psychiatric:         Mood and Affect: Mood normal.         Medical problems and test results were reviewed with the patient today.     No results found for this or any previous visit (from the past 4 weeks).  Lab Results   Component Value Date/Time    CHOL 166 02/22/2023 09:59 AM    HDL 52 02/22/2023 09:59 AM    LDL 84.2 02/22/2023 09:59 AM    VLDL 29.8 02/22/2023 09:59 AM    VLDL 23 02/14/2022 09:56 AM     No results found for any visits on 04/10/25.    ASSESSMENT and PLAN    Mi was seen today for palpitations.    Diagnoses and all orders for this visit:    PSVT (paroxysmal supraventricular tachycardia):Symptoms have resolved.SVT is brief <1 minute.I suggested Toprol 12.5 mg daily if symptoms return.Call and follow up as needed.Monitor home BP and HR.          Disposition:    Return in about 6 months (around 10/10/2025).                SENAIT CROWE MD  4/10/2025  9:49 AM

## (undated) DEVICE — SUTURE VCRL SZ 2-0 L27IN ABSRB UD L26MM CT-2 1/2 CIR J269H

## (undated) DEVICE — BANDAGE,GAUZE,CONFORMING,2"X75",STRL,LF: Brand: MEDLINE INDUSTRIES, INC.

## (undated) DEVICE — SOLUTION IRRIG 1000ML 0.9% SOD CHL USP POUR PLAS BTL

## (undated) DEVICE — PRECISION THIN, OFFSET (5.5 X 0.38 X 25.0MM)

## (undated) DEVICE — BANDAGE,ELASTIC,ESMARK,STERILE,4"X9',LF: Brand: MEDLINE

## (undated) DEVICE — BANDAGE COMPR L5YDXW2IN FOAM CO FLX

## (undated) DEVICE — GOWN,SIRUS,NONRNF,SETINSLV,XL,20/CS: Brand: MEDLINE

## (undated) DEVICE — GLOVE SURG SZ 8 L12IN FNGR THK79MIL GRN LTX FREE

## (undated) DEVICE — DRESSING PETRO W3XL8IN OIL EMUL N ADH GZ KNIT IMPREG CELOS

## (undated) DEVICE — GLOVE SURG SZ 65 L12IN FNGR THK79MIL GRN LTX FREE

## (undated) DEVICE — DRILL BIT

## (undated) DEVICE — FOOT & ANKLE SOFT DR WOMACK: Brand: MEDLINE INDUSTRIES, INC.

## (undated) DEVICE — ZIMMER® STERILE DISPOSABLE TOURNIQUET CUFF WITH PLC, DUAL PORT, SINGLE BLADDER, 18 IN. (46 CM)

## (undated) DEVICE — BNDG,ELSTC,MATRIX,STRL,3"X5YD,LF,HOOK&LP: Brand: MEDLINE

## (undated) DEVICE — GLOVE SURG SZ 65 CRM LTX FREE POLYISOPRENE POLYMER BEAD ANTI